# Patient Record
Sex: FEMALE | Race: WHITE | NOT HISPANIC OR LATINO
[De-identification: names, ages, dates, MRNs, and addresses within clinical notes are randomized per-mention and may not be internally consistent; named-entity substitution may affect disease eponyms.]

---

## 2017-01-06 ENCOUNTER — APPOINTMENT (OUTPATIENT)
Dept: ORTHOPEDIC SURGERY | Facility: CLINIC | Age: 82
End: 2017-01-06

## 2017-01-06 VITALS
HEART RATE: 71 BPM | BODY MASS INDEX: 23.92 KG/M2 | SYSTOLIC BLOOD PRESSURE: 112 MMHG | DIASTOLIC BLOOD PRESSURE: 71 MMHG | WEIGHT: 130 LBS | HEIGHT: 62 IN

## 2017-01-06 DIAGNOSIS — Z86.73 PERSONAL HISTORY OF TRANSIENT ISCHEMIC ATTACK (TIA), AND CEREBRAL INFARCTION W/OUT RESIDUAL DEFICITS: ICD-10-CM

## 2017-01-06 DIAGNOSIS — M17.0 BILATERAL PRIMARY OSTEOARTHRITIS OF KNEE: ICD-10-CM

## 2017-01-06 DIAGNOSIS — Z80.9 FAMILY HISTORY OF MALIGNANT NEOPLASM, UNSPECIFIED: ICD-10-CM

## 2017-01-06 DIAGNOSIS — Z86.79 PERSONAL HISTORY OF OTHER DISEASES OF THE CIRCULATORY SYSTEM: ICD-10-CM

## 2017-01-06 DIAGNOSIS — Z78.9 OTHER SPECIFIED HEALTH STATUS: ICD-10-CM

## 2017-01-06 DIAGNOSIS — E78.00 PURE HYPERCHOLESTEROLEMIA, UNSPECIFIED: ICD-10-CM

## 2017-01-06 DIAGNOSIS — Z85.9 PERSONAL HISTORY OF MALIGNANT NEOPLASM, UNSPECIFIED: ICD-10-CM

## 2017-01-06 RX ORDER — ATENOLOL 25 MG/1
25 TABLET ORAL
Refills: 0 | Status: ACTIVE | COMMUNITY

## 2017-01-06 RX ORDER — DIGOXIN 125 UG/1
125 TABLET ORAL
Refills: 0 | Status: ACTIVE | COMMUNITY

## 2017-01-06 RX ORDER — UBIDECARENONE/VIT E ACET 100MG-5
CAPSULE ORAL
Refills: 0 | Status: ACTIVE | COMMUNITY

## 2017-01-06 RX ORDER — CALCIUM CARBONATE/VITAMIN D3 600 MG-10
TABLET ORAL
Refills: 0 | Status: ACTIVE | COMMUNITY

## 2017-01-06 RX ORDER — FUROSEMIDE 20 MG/1
20 TABLET ORAL
Refills: 0 | Status: ACTIVE | COMMUNITY

## 2017-01-06 RX ORDER — MULTIVITAMIN
TABLET ORAL
Refills: 0 | Status: ACTIVE | COMMUNITY

## 2017-01-06 RX ORDER — WARFARIN 4 MG/1
TABLET ORAL
Refills: 0 | Status: ACTIVE | COMMUNITY

## 2018-04-25 ENCOUNTER — INPATIENT (INPATIENT)
Facility: HOSPITAL | Age: 83
LOS: 5 days | DRG: 871 | End: 2018-05-01
Attending: INTERNAL MEDICINE | Admitting: HOSPITALIST
Payer: MEDICARE

## 2018-04-25 VITALS
WEIGHT: 125 LBS | OXYGEN SATURATION: 86 % | RESPIRATION RATE: 24 BRPM | SYSTOLIC BLOOD PRESSURE: 154 MMHG | HEART RATE: 85 BPM | DIASTOLIC BLOOD PRESSURE: 70 MMHG | HEIGHT: 62 IN | TEMPERATURE: 100 F

## 2018-04-25 PROCEDURE — 99285 EMERGENCY DEPT VISIT HI MDM: CPT

## 2018-04-25 NOTE — ED ADULT TRIAGE NOTE - CHIEF COMPLAINT QUOTE
pt biba from home c/o SOB.  was seen recently by pmd to r/o pneumonia and was prescribed antibiotics.  pt has been taking cefuroxime for three days.  as per EMS, room air sat was 80%.

## 2018-04-26 DIAGNOSIS — A41.9 SEPSIS, UNSPECIFIED ORGANISM: ICD-10-CM

## 2018-04-26 DIAGNOSIS — Z98.890 OTHER SPECIFIED POSTPROCEDURAL STATES: Chronic | ICD-10-CM

## 2018-04-26 LAB
ALBUMIN SERPL ELPH-MCNC: 4.1 G/DL — SIGNIFICANT CHANGE UP (ref 3.3–5.2)
ALP SERPL-CCNC: 99 U/L — SIGNIFICANT CHANGE UP (ref 40–120)
ALT FLD-CCNC: 30 U/L — SIGNIFICANT CHANGE UP
ANION GAP SERPL CALC-SCNC: 14 MMOL/L — SIGNIFICANT CHANGE UP (ref 5–17)
ANION GAP SERPL CALC-SCNC: 15 MMOL/L — SIGNIFICANT CHANGE UP (ref 5–17)
APPEARANCE UR: CLEAR — SIGNIFICANT CHANGE UP
APTT BLD: 42.3 SEC — HIGH (ref 27.5–37.4)
AST SERPL-CCNC: 39 U/L — HIGH
BASOPHILS # BLD AUTO: 0 K/UL — SIGNIFICANT CHANGE UP (ref 0–0.2)
BASOPHILS # BLD AUTO: 0 K/UL — SIGNIFICANT CHANGE UP (ref 0–0.2)
BASOPHILS NFR BLD AUTO: 0.1 % — SIGNIFICANT CHANGE UP (ref 0–2)
BASOPHILS NFR BLD AUTO: 0.2 % — SIGNIFICANT CHANGE UP (ref 0–2)
BILIRUB SERPL-MCNC: 0.8 MG/DL — SIGNIFICANT CHANGE UP (ref 0.4–2)
BILIRUB UR-MCNC: NEGATIVE — SIGNIFICANT CHANGE UP
BUN SERPL-MCNC: 26 MG/DL — HIGH (ref 8–20)
BUN SERPL-MCNC: 27 MG/DL — HIGH (ref 8–20)
CALCIUM SERPL-MCNC: 8.5 MG/DL — LOW (ref 8.6–10.2)
CALCIUM SERPL-MCNC: 8.6 MG/DL — SIGNIFICANT CHANGE UP (ref 8.6–10.2)
CHLORIDE SERPL-SCNC: 88 MMOL/L — LOW (ref 98–107)
CHLORIDE SERPL-SCNC: 92 MMOL/L — LOW (ref 98–107)
CK MB CFR SERPL CALC: 10.6 NG/ML — HIGH (ref 0–6.7)
CK SERPL-CCNC: 217 U/L — HIGH (ref 25–170)
CO2 SERPL-SCNC: 25 MMOL/L — SIGNIFICANT CHANGE UP (ref 22–29)
CO2 SERPL-SCNC: 26 MMOL/L — SIGNIFICANT CHANGE UP (ref 22–29)
COLOR SPEC: YELLOW — SIGNIFICANT CHANGE UP
CREAT SERPL-MCNC: 1.15 MG/DL — SIGNIFICANT CHANGE UP (ref 0.5–1.3)
CREAT SERPL-MCNC: 1.19 MG/DL — SIGNIFICANT CHANGE UP (ref 0.5–1.3)
DIFF PNL FLD: ABNORMAL
EOSINOPHIL # BLD AUTO: 0 K/UL — SIGNIFICANT CHANGE UP (ref 0–0.5)
EOSINOPHIL # BLD AUTO: 0 K/UL — SIGNIFICANT CHANGE UP (ref 0–0.5)
EOSINOPHIL NFR BLD AUTO: 0 % — SIGNIFICANT CHANGE UP (ref 0–6)
EOSINOPHIL NFR BLD AUTO: 0 % — SIGNIFICANT CHANGE UP (ref 0–6)
EPI CELLS # UR: SIGNIFICANT CHANGE UP
GLUCOSE SERPL-MCNC: 137 MG/DL — HIGH (ref 70–115)
GLUCOSE SERPL-MCNC: 157 MG/DL — HIGH (ref 70–115)
GLUCOSE UR QL: NEGATIVE MG/DL — SIGNIFICANT CHANGE UP
HCT VFR BLD CALC: 29.3 % — LOW (ref 37–47)
HCT VFR BLD CALC: 34 % — LOW (ref 37–47)
HGB BLD-MCNC: 10.8 G/DL — LOW (ref 12–16)
HGB BLD-MCNC: 9.4 G/DL — LOW (ref 12–16)
HMPV RNA SPEC QL NAA+PROBE: DETECTED
INR BLD: 4.1 RATIO — HIGH (ref 0.88–1.16)
INR BLD: 4.67 RATIO — HIGH (ref 0.88–1.16)
KETONES UR-MCNC: ABNORMAL
LACTATE BLDV-MCNC: 1.5 MMOL/L — SIGNIFICANT CHANGE UP (ref 0.5–2)
LACTATE BLDV-MCNC: 2.5 MMOL/L — HIGH (ref 0.5–2)
LEUKOCYTE ESTERASE UR-ACNC: NEGATIVE — SIGNIFICANT CHANGE UP
LYMPHOCYTES # BLD AUTO: 0.3 K/UL — LOW (ref 1–4.8)
LYMPHOCYTES # BLD AUTO: 0.4 K/UL — LOW (ref 1–4.8)
LYMPHOCYTES # BLD AUTO: 4.8 % — LOW (ref 20–55)
LYMPHOCYTES # BLD AUTO: 6.8 % — LOW (ref 20–55)
MAGNESIUM SERPL-MCNC: 1.8 MG/DL — SIGNIFICANT CHANGE UP (ref 1.8–2.6)
MCHC RBC-ENTMCNC: 30.3 PG — SIGNIFICANT CHANGE UP (ref 27–31)
MCHC RBC-ENTMCNC: 30.8 PG — SIGNIFICANT CHANGE UP (ref 27–31)
MCHC RBC-ENTMCNC: 31.8 G/DL — LOW (ref 32–36)
MCHC RBC-ENTMCNC: 32.1 G/DL — SIGNIFICANT CHANGE UP (ref 32–36)
MCV RBC AUTO: 94.5 FL — SIGNIFICANT CHANGE UP (ref 81–99)
MCV RBC AUTO: 96.9 FL — SIGNIFICANT CHANGE UP (ref 81–99)
MONOCYTES # BLD AUTO: 0.3 K/UL — SIGNIFICANT CHANGE UP (ref 0–0.8)
MONOCYTES # BLD AUTO: 0.7 K/UL — SIGNIFICANT CHANGE UP (ref 0–0.8)
MONOCYTES NFR BLD AUTO: 6.2 % — SIGNIFICANT CHANGE UP (ref 3–10)
MONOCYTES NFR BLD AUTO: 8.3 % — SIGNIFICANT CHANGE UP (ref 3–10)
NEUTROPHILS # BLD AUTO: 4.2 K/UL — SIGNIFICANT CHANGE UP (ref 1.8–8)
NEUTROPHILS # BLD AUTO: 7.5 K/UL — SIGNIFICANT CHANGE UP (ref 1.8–8)
NEUTROPHILS NFR BLD AUTO: 86.5 % — HIGH (ref 37–73)
NEUTROPHILS NFR BLD AUTO: 86.8 % — HIGH (ref 37–73)
NITRITE UR-MCNC: NEGATIVE — SIGNIFICANT CHANGE UP
NT-PROBNP SERPL-SCNC: HIGH PG/ML (ref 0–300)
PH UR: 5 — SIGNIFICANT CHANGE UP (ref 5–8)
PHOSPHATE SERPL-MCNC: 4 MG/DL — SIGNIFICANT CHANGE UP (ref 2.4–4.7)
PLATELET # BLD AUTO: 108 K/UL — LOW (ref 150–400)
PLATELET # BLD AUTO: 168 K/UL — SIGNIFICANT CHANGE UP (ref 150–400)
POTASSIUM SERPL-MCNC: 3.9 MMOL/L — SIGNIFICANT CHANGE UP (ref 3.5–5.3)
POTASSIUM SERPL-MCNC: 4.2 MMOL/L — SIGNIFICANT CHANGE UP (ref 3.5–5.3)
POTASSIUM SERPL-SCNC: 3.9 MMOL/L — SIGNIFICANT CHANGE UP (ref 3.5–5.3)
POTASSIUM SERPL-SCNC: 4.2 MMOL/L — SIGNIFICANT CHANGE UP (ref 3.5–5.3)
PROT SERPL-MCNC: 6.7 G/DL — SIGNIFICANT CHANGE UP (ref 6.6–8.7)
PROT UR-MCNC: 30 MG/DL
PROTHROM AB SERPL-ACNC: 46.4 SEC — HIGH (ref 9.8–12.7)
PROTHROM AB SERPL-ACNC: 53 SEC — HIGH (ref 9.8–12.7)
RAPID RVP RESULT: DETECTED
RBC # BLD: 3.1 M/UL — LOW (ref 4.4–5.2)
RBC # BLD: 3.51 M/UL — LOW (ref 4.4–5.2)
RBC # FLD: 13.8 % — SIGNIFICANT CHANGE UP (ref 11–15.6)
RBC # FLD: 14 % — SIGNIFICANT CHANGE UP (ref 11–15.6)
RBC CASTS # UR COMP ASSIST: SIGNIFICANT CHANGE UP /HPF (ref 0–4)
SODIUM SERPL-SCNC: 129 MMOL/L — LOW (ref 135–145)
SODIUM SERPL-SCNC: 131 MMOL/L — LOW (ref 135–145)
SP GR SPEC: 1.01 — SIGNIFICANT CHANGE UP (ref 1.01–1.02)
TROPONIN T SERPL-MCNC: 0.26 NG/ML — HIGH (ref 0–0.06)
UROBILINOGEN FLD QL: NEGATIVE MG/DL — SIGNIFICANT CHANGE UP
WBC # BLD: 4.8 K/UL — SIGNIFICANT CHANGE UP (ref 4.8–10.8)
WBC # BLD: 8.7 K/UL — SIGNIFICANT CHANGE UP (ref 4.8–10.8)
WBC # FLD AUTO: 4.8 K/UL — SIGNIFICANT CHANGE UP (ref 4.8–10.8)
WBC # FLD AUTO: 8.7 K/UL — SIGNIFICANT CHANGE UP (ref 4.8–10.8)
WBC UR QL: NEGATIVE — SIGNIFICANT CHANGE UP

## 2018-04-26 PROCEDURE — 99223 1ST HOSP IP/OBS HIGH 75: CPT

## 2018-04-26 PROCEDURE — 71045 X-RAY EXAM CHEST 1 VIEW: CPT | Mod: 26

## 2018-04-26 RX ORDER — ACETAMINOPHEN 500 MG
650 TABLET ORAL EVERY 6 HOURS
Qty: 0 | Refills: 0 | Status: DISCONTINUED | OUTPATIENT
Start: 2018-04-26 | End: 2018-05-01

## 2018-04-26 RX ORDER — CHOLECALCIFEROL (VITAMIN D3) 125 MCG
1 CAPSULE ORAL
Qty: 0 | Refills: 0 | COMMUNITY

## 2018-04-26 RX ORDER — AZITHROMYCIN 500 MG/1
500 TABLET, FILM COATED ORAL EVERY 24 HOURS
Qty: 0 | Refills: 0 | Status: COMPLETED | OUTPATIENT
Start: 2018-04-26 | End: 2018-04-28

## 2018-04-26 RX ORDER — DIGOXIN 250 MCG
0.12 TABLET ORAL DAILY
Qty: 0 | Refills: 0 | Status: DISCONTINUED | OUTPATIENT
Start: 2018-04-26 | End: 2018-05-01

## 2018-04-26 RX ORDER — SODIUM CHLORIDE 9 MG/ML
3 INJECTION INTRAMUSCULAR; INTRAVENOUS; SUBCUTANEOUS ONCE
Qty: 0 | Refills: 0 | Status: COMPLETED | OUTPATIENT
Start: 2018-04-26 | End: 2018-04-26

## 2018-04-26 RX ORDER — SODIUM CHLORIDE 9 MG/ML
1800 INJECTION INTRAMUSCULAR; INTRAVENOUS; SUBCUTANEOUS ONCE
Qty: 0 | Refills: 0 | Status: COMPLETED | OUTPATIENT
Start: 2018-04-26 | End: 2018-04-26

## 2018-04-26 RX ORDER — IPRATROPIUM/ALBUTEROL SULFATE 18-103MCG
3 AEROSOL WITH ADAPTER (GRAM) INHALATION EVERY 6 HOURS
Qty: 0 | Refills: 0 | Status: COMPLETED | OUTPATIENT
Start: 2018-04-26 | End: 2018-04-27

## 2018-04-26 RX ORDER — ALBUTEROL 90 UG/1
2.5 AEROSOL, METERED ORAL EVERY 4 HOURS
Qty: 0 | Refills: 0 | Status: DISCONTINUED | OUTPATIENT
Start: 2018-04-26 | End: 2018-05-01

## 2018-04-26 RX ORDER — ALBUTEROL 90 UG/1
2.5 AEROSOL, METERED ORAL ONCE
Qty: 0 | Refills: 0 | Status: COMPLETED | OUTPATIENT
Start: 2018-04-26 | End: 2018-04-26

## 2018-04-26 RX ORDER — SACCHAROMYCES BOULARDII 250 MG
250 POWDER IN PACKET (EA) ORAL
Qty: 0 | Refills: 0 | Status: DISCONTINUED | OUTPATIENT
Start: 2018-04-26 | End: 2018-05-01

## 2018-04-26 RX ORDER — MULTIVIT-MIN/FERROUS GLUCONATE 9 MG/15 ML
1 LIQUID (ML) ORAL
Qty: 0 | Refills: 0 | COMMUNITY

## 2018-04-26 RX ORDER — CEFTRIAXONE 500 MG/1
1000 INJECTION, POWDER, FOR SOLUTION INTRAMUSCULAR; INTRAVENOUS EVERY 24 HOURS
Qty: 0 | Refills: 0 | Status: DISCONTINUED | OUTPATIENT
Start: 2018-04-26 | End: 2018-04-29

## 2018-04-26 RX ORDER — ACETAMINOPHEN 500 MG
650 TABLET ORAL ONCE
Qty: 0 | Refills: 0 | Status: COMPLETED | OUTPATIENT
Start: 2018-04-26 | End: 2018-04-26

## 2018-04-26 RX ORDER — PIPERACILLIN AND TAZOBACTAM 4; .5 G/20ML; G/20ML
3.38 INJECTION, POWDER, LYOPHILIZED, FOR SOLUTION INTRAVENOUS ONCE
Qty: 0 | Refills: 0 | Status: COMPLETED | OUTPATIENT
Start: 2018-04-26 | End: 2018-04-26

## 2018-04-26 RX ORDER — ACETAMINOPHEN 500 MG
1000 TABLET ORAL ONCE
Qty: 0 | Refills: 0 | Status: COMPLETED | OUTPATIENT
Start: 2018-04-26 | End: 2018-04-26

## 2018-04-26 RX ORDER — FUROSEMIDE 40 MG
40 TABLET ORAL ONCE
Qty: 0 | Refills: 0 | Status: COMPLETED | OUTPATIENT
Start: 2018-04-26 | End: 2018-04-26

## 2018-04-26 RX ORDER — FUROSEMIDE 40 MG
1 TABLET ORAL
Qty: 0 | Refills: 0 | COMMUNITY

## 2018-04-26 RX ORDER — CEFTRIAXONE 500 MG/1
1 INJECTION, POWDER, FOR SOLUTION INTRAMUSCULAR; INTRAVENOUS EVERY 24 HOURS
Qty: 0 | Refills: 0 | Status: DISCONTINUED | OUTPATIENT
Start: 2018-04-26 | End: 2018-04-26

## 2018-04-26 RX ORDER — WARFARIN SODIUM 2.5 MG/1
3.5 TABLET ORAL
Qty: 0 | Refills: 0 | COMMUNITY

## 2018-04-26 RX ORDER — IPRATROPIUM/ALBUTEROL SULFATE 18-103MCG
3 AEROSOL WITH ADAPTER (GRAM) INHALATION ONCE
Qty: 0 | Refills: 0 | Status: COMPLETED | OUTPATIENT
Start: 2018-04-26 | End: 2018-04-26

## 2018-04-26 RX ORDER — CEFUROXIME AXETIL 250 MG
1 TABLET ORAL
Qty: 0 | Refills: 0 | COMMUNITY

## 2018-04-26 RX ORDER — ATENOLOL 25 MG/1
1 TABLET ORAL
Qty: 0 | Refills: 0 | COMMUNITY

## 2018-04-26 RX ORDER — CHOLECALCIFEROL (VITAMIN D3) 125 MCG
1000 CAPSULE ORAL DAILY
Qty: 0 | Refills: 0 | Status: DISCONTINUED | OUTPATIENT
Start: 2018-04-26 | End: 2018-05-01

## 2018-04-26 RX ORDER — SODIUM CHLORIDE 9 MG/ML
250 INJECTION INTRAMUSCULAR; INTRAVENOUS; SUBCUTANEOUS
Qty: 0 | Refills: 0 | Status: COMPLETED | OUTPATIENT
Start: 2018-04-26 | End: 2018-04-26

## 2018-04-26 RX ORDER — VANCOMYCIN HCL 1 G
1000 VIAL (EA) INTRAVENOUS ONCE
Qty: 0 | Refills: 0 | Status: COMPLETED | OUTPATIENT
Start: 2018-04-26 | End: 2018-04-26

## 2018-04-26 RX ADMIN — AZITHROMYCIN 255 MILLIGRAM(S): 500 TABLET, FILM COATED ORAL at 18:39

## 2018-04-26 RX ADMIN — Medication 3 MILLILITER(S): at 00:16

## 2018-04-26 RX ADMIN — PIPERACILLIN AND TAZOBACTAM 200 GRAM(S): 4; .5 INJECTION, POWDER, LYOPHILIZED, FOR SOLUTION INTRAVENOUS at 01:27

## 2018-04-26 RX ADMIN — Medication 40 MILLIGRAM(S): at 00:24

## 2018-04-26 RX ADMIN — SODIUM CHLORIDE 3 MILLILITER(S): 9 INJECTION INTRAMUSCULAR; INTRAVENOUS; SUBCUTANEOUS at 00:25

## 2018-04-26 RX ADMIN — Medication 3 MILLILITER(S): at 21:13

## 2018-04-26 RX ADMIN — Medication 250 MILLIGRAM(S): at 05:22

## 2018-04-26 RX ADMIN — Medication 0.12 MILLIGRAM(S): at 05:22

## 2018-04-26 RX ADMIN — SODIUM CHLORIDE 80 MILLILITER(S): 9 INJECTION INTRAMUSCULAR; INTRAVENOUS; SUBCUTANEOUS at 06:02

## 2018-04-26 RX ADMIN — Medication 1000 UNIT(S): at 11:21

## 2018-04-26 RX ADMIN — SODIUM CHLORIDE 900 MILLILITER(S): 9 INJECTION INTRAMUSCULAR; INTRAVENOUS; SUBCUTANEOUS at 01:36

## 2018-04-26 RX ADMIN — ALBUTEROL 2.5 MILLIGRAM(S): 90 AEROSOL, METERED ORAL at 00:16

## 2018-04-26 RX ADMIN — Medication 3 MILLILITER(S): at 15:42

## 2018-04-26 RX ADMIN — Medication 400 MILLIGRAM(S): at 14:36

## 2018-04-26 RX ADMIN — Medication 1 TABLET(S): at 11:21

## 2018-04-26 RX ADMIN — CEFTRIAXONE 1000 MILLIGRAM(S): 500 INJECTION, POWDER, FOR SOLUTION INTRAMUSCULAR; INTRAVENOUS at 18:36

## 2018-04-26 RX ADMIN — Medication 250 MILLIGRAM(S): at 00:29

## 2018-04-26 RX ADMIN — Medication 650 MILLIGRAM(S): at 00:30

## 2018-04-26 RX ADMIN — Medication 250 MILLIGRAM(S): at 18:37

## 2018-04-26 NOTE — H&P ADULT - HISTORY OF PRESENT ILLNESS
89 years old female with PMH of CHF, A. Fib on Coumadin, MV Repair, TIA and Breast Cancer brought by EMS with shortness of breath. As per patient, it started 2 days ago and is associated with productive cough. She went to see her PMD yesterday, who gave her Cefuroxime and Benzonatate She took 3 doses of antibiotics but her symptoms were worsening instead of improving so she came to the hospital. She is feeling weak and nauseous but denies vomiting, abdominal pain, headache, dizziness, fever, sick contacts or recent travel.  She was Code Sepsis in ER.

## 2018-04-26 NOTE — ED PROVIDER NOTE - OBJECTIVE STATEMENT
89 y/o F with PMHx of CHF, a-fib, MVP, pacemaker and TIA presents to ED c/o difficulty breathing onset 2 days ago. Associated productive cough with yellow phlegm. Denies any hx of respiratory problems, abdominal pain, nausea, vomiting, diarrhea, headaches, chest pain or palpitations. Saw PMD yesterday for cough, patient states 'I was given medicine for it but cannot recall name". Denies smoking. No further acute complaints at this time.    Cardiologist: Dr. Ellis   PMD: Dr. Andrews    Code sepsis initiated. 89 y/o F with PMHx of CHF, a-fib, MVP, pacemaker and TIA presents to ED c/o difficulty breathing onset 2 days ago. Associated productive cough with yellow phlegm. Denies any hx of respiratory problems, abdominal pain, nausea, vomiting, diarrhea, headaches, chest pain or palpitations. Saw PMD yesterday for cough, patient states 'I was given medicine for it but cannot recall name". Denies smoking. No further acute complaints at this time.    Cardiologist: Dr. Sotelo   PMD: Dr. Andrews    Code sepsis initiated.

## 2018-04-26 NOTE — PATIENT PROFILE ADULT. - VISION (WITH CORRECTIVE LENSES IF THE PATIENT USUALLY WEARS THEM):
Partially impaired: cannot see medication labels or newsprint, but can see obstacles in path, and the surrounding layout; can count fingers at arm's length/wear Glasses at home

## 2018-04-26 NOTE — H&P ADULT - ASSESSMENT
89 years old female with PMH of CHF, A. Fib on Coumadin, MV Repair, TIA and Breast Cancer brought by EMS with shortness of breath. As per patient, it started 2 days ago and is associated with productive cough. She went to see her PMD yesterday, who gave her Cefuroxime and Benzonatate She took 3 doses of antibiotics but her symptoms were worsening instead of improving so she came to the hospital. She is feeling weak and nauseous but denies vomiting, abdominal pain, headache, dizziness, fever, sick contacts or recent travel.  She was Code Sepsis in ER. 89 years old female with PMH of CHF, A. Fib on Coumadin, MV Repair, TIA and Breast Cancer brought by EMS with shortness of breath. As per patient, it started 2 days ago and is associated with productive cough. She went to see her PMD yesterday, who gave her Cefuroxime and Benzonatate She took 3 doses of antibiotics but her symptoms were worsening instead of improving so she came to the hospital. She is feeling weak and nauseous but denies vomiting, abdominal pain, headache, dizziness, fever, sick contacts or recent travel.  She was Code Sepsis in ER.     1) Community Acquired Pneumonia (likely strep/atypical bacteria)  - Blood Cultures sent  - RVP  - Sputum Culture  - Legionella Antigen  - Rocephin 1 gm and Zithromax 500 mg  - Florastor 250 mg  2) Sepsis  - Cultures sent and 250 cc IVF given. Hold further IVF given history of CHF.  - Repeat Lactate  - Got Vanco and Zosyn in ER. Will continue Rocephin and Zithromax for CAP  3) 89 years old female with PMH of CHF, A. Fib on Coumadin, MV Repair, TIA and Breast Cancer brought by EMS with shortness of breath. As per patient, it started 2 days ago and is associated with productive cough. She went to see her PMD yesterday, who gave her Cefuroxime and Benzonatate She took 3 doses of antibiotics but her symptoms were worsening instead of improving so she came to the hospital. She is feeling weak and nauseous but denies vomiting, abdominal pain, headache, dizziness, fever, sick contacts or recent travel.  She was Code Sepsis in ER.     1) Community Acquired Pneumonia (likely strep/atypical bacteria)  - Blood Cultures sent  - RVP  - Sputum Culture  - Legionella Antigen  - Rocephin 1 gm and Zithromax 500 mg  - Florastor 250 mg  2) Sepsis  - Cultures sent and 250 cc IVF given. Hold further IVF given history of CHF.  - Repeat Lactate  - Got Vanco and Zosyn in ER. Will continue Rocephin and Zithromax for CAP  3) Systolic Heart Failure  - Hold Lasix and Atenolol for now.   - Continue Digoxin.  - Not on ACEI/ARB  4) Atrial Fibrillation  - Rate controlled  - Continue Digoxin   - Hold Atenolol secondary to Sepsis. Will restart if BP remains stable.  - INR supratherapeutic  5) Supratherapeutic INR  - No signs of bleeding  - Repeat INR in am  6) VERONIKA  - Hold Lasix  - Got 250 cc IVF.   - Monitor renal function.  - Avoid nephrotoxic medications  DVT Prophylaxis -- Patient is on Coumadin and INR is supratherapeutic. 89 years old female with PMH of CHF, A. Fib on Coumadin, MV Repair, TIA and Breast Cancer brought by EMS with shortness of breath. As per patient, it started 2 days ago and is associated with productive cough. She went to see her PMD yesterday, who gave her Cefuroxime and Benzonatate She took 3 doses of antibiotics but her symptoms were worsening instead of improving so she came to the hospital. She is feeling weak and nauseous but denies vomiting, abdominal pain, headache, dizziness, fever, sick contacts or recent travel.  She was Code Sepsis in ER.     1) Community Acquired Pneumonia (likely strep/atypical bacteria)  - Blood Cultures sent  - RVP  - Sputum Culture  - Legionella Antigen  - Rocephin 1 gm and Zithromax 500 mg  - Florastor 250 mg  2) Sepsis  - Cultures sent and 250 cc IVF given. Hold further IVF given history of CHF.  - Repeat Lactate  - Got Vanco and Zosyn in ER. Will continue Rocephin and Zithromax for CAP  3) Systolic Heart Failure  - Hold Lasix and Atenolol for now.   - Continue Digoxin.  - Not on ACEI/ARB  4) Atrial Fibrillation  - Rate controlled  - Continue Digoxin   - Hold Atenolol secondary to Sepsis. Will restart if BP remains stable.  - INR supratherapeutic  5) Supratherapeutic INR  - No signs of bleeding  - Repeat INR in am  6) VERONIKA  - Hold Lasix  - Got 250 cc IVF.   - Monitor renal function.  - Avoid nephrotoxic medications  7) Anemia  - Unknown baseline  - Denies GI bleed  - Monitor H&H  DVT Prophylaxis -- Patient is on Coumadin and INR is supratherapeutic.

## 2018-04-26 NOTE — PROGRESS NOTE ADULT - SUBJECTIVE AND OBJECTIVE BOX
CC: Cough . pneumonia. Humanmetapneuvirus infection .  HPI:  89 years old female with PMH of CHF, A. Fib on Coumadin, MV Repair, TIA and Breast Cancer brought by EMS with shortness of breath. As per patient, it started 2 days ago and is associated with productive cough. She went to see her PMD yesterday, who gave her Cefuroxime and Benzonatate She took 3 doses of antibiotics but her symptoms were worsening instead of improving so she came to the hospital. She is feeling weak and nauseous but denies vomiting, abdominal pain, headache, dizziness, fever, sick contacts or recent travel.  She was Code Sepsis in ER. (2018 02:39)    REVIEW OF SYSTEMS:    Patient denied fever, chills, abdominal pain, nausea, vomiting, cough, shortness of breath, chest pain or palpitations    Vital Signs Last 24 Hrs  T(C): 36.7 (2018 07:47), Max: 39.1 (2018 00:14)  T(F): 98.1 (2018 07:47), Max: 102.3 (2018 00:14)  HR: 61 (2018 11:39) (61 - 85)  BP: 121/70 (2018 11:39) (90/52 - 154/70)  BP(mean): 70 (2018 05:23) (66 - 70)  RR: 22 (2018 11:39) (20 - 26)  SpO2: 98% (2018 11:39) (86% - 98%)I&O's Summary    PHYSICAL EXAM:  GENERAL: NAD,  HEENT: PERRL, +EOMI, anicteric, no [place]  NECK: Supple, No JVD   CHEST/LUNG:  bilateral rale, crackles   HEART: S1S2 Normal intensity, no murmurs, gallops or rubs noted  ABDOMEN: Soft, BS Normoactive, NT, ND, no HSM noted  EXTREMITIES:  2+ radial and DP pulses noted, no clubbing, cyanosis, or edema noted, Limited mobility   SKIN: No rashes or lesions noted  NEURO: A&Ox3, no focal deficits noted, CN II-XII intact  PSYCH: normal mood and affect; insight/judgement appropriate  LABS:                        9.4    4.8   )-----------( 108      ( 2018 07:45 )             29.3         131<L>  |  92<L>  |  27.0<H>  ----------------------------<  137<H>  3.9   |  25.0  |  1.15    Ca    8.6      2018 07:44  Phos  4.0       Mg     1.8         TPro  6.7  /  Alb  4.1  /  TBili  0.8  /  DBili  x   /  AST  39<H>  /  ALT  30  /  AlkPhos  99      PT/INR - ( 2018 07:44 )   PT: 53.0 sec;   INR: 4.67 ratio         PTT - ( 2018 00:33 )  PTT:42.3 sec  Urinalysis Basic - ( 2018 03:24 )    Color: Yellow / Appearance: Clear / S.015 / pH: x  Gluc: x / Ketone: Trace  / Bili: Negative / Urobili: Negative mg/dL   Blood: x / Protein: 30 mg/dL / Nitrite: Negative   Leuk Esterase: Negative / RBC: 0-2 /HPF / WBC Negative   Sq Epi: x / Non Sq Epi: Occasional / Bacteria: x      RADIOLOGY & ADDITIONAL TESTS:    MEDICATIONS:  MEDICATIONS  (STANDING):  ALBUTerol/ipratropium for Nebulization 3 milliLiter(s) Nebulizer every 6 hours  azithromycin  IVPB 500 milliGRAM(s) IV Intermittent every 24 hours  cefTRIAXone Injectable. 1000 milliGRAM(s) IV Push every 24 hours  cholecalciferol 1000 Unit(s) Oral daily  digoxin     Tablet 0.125 milliGRAM(s) Oral daily  multivitamin 1 Tablet(s) Oral daily  saccharomyces boulardii 250 milliGRAM(s) Oral two times a day    MEDICATIONS  (PRN):  acetaminophen   Tablet 650 milliGRAM(s) Oral every 6 hours PRN For Temp greater than 38 C (100.4 F)  acetaminophen   Tablet. 650 milliGRAM(s) Oral every 6 hours PRN Headache or Bodyache  ALBUTerol    0.083% 2.5 milliGRAM(s) Nebulizer every 4 hours PRN Shortness of Breath and/or Wheezing  guaiFENesin   Syrup  (Sugar-Free) 100 milliGRAM(s) Oral every 6 hours PRN Cough

## 2018-04-26 NOTE — ED ADULT NURSE NOTE - PMH
Afib    Breast CA, right  lumpectomy  MVP (mitral valve prolapse)    Pacemaker    TIA (transient ischemic attack)

## 2018-04-26 NOTE — H&P ADULT - NSHPPHYSICALEXAM_GEN_ALL_CORE
Vital Signs   T(C): 36.8 (26 Apr 2018 02:03), Max: 39.1 (26 Apr 2018 00:14)  T(F): 98.2 (26 Apr 2018 02:03), Max: 102.3 (26 Apr 2018 00:14)  HR: 64 (26 Apr 2018 02:03) (62 - 85)  BP: 90/52 (26 Apr 2018 02:03) (90/52 - 154/70)  RR: 24 (26 Apr 2018 02:03) (24 - 26)  SpO2: 95% (26 Apr 2018 02:03) (86% - 95%)  General: Elderly female sitting in bed with mild distress secondary to coughing  HEENT: PERRLA. EOMI. Clear conjunctivae. Moist mucus membrane  Neck: Supple. No JVD. No Thyromegaly   Chest: Good air entry. Bilateral crackles (R>L). No wheezing rhonchi. Pacemaker in left upper chest.   Heart: Normal S1 & S2 with regularly irregular rhythm.   Abdomen: Soft. Non-tender. Non-distended. + BS  Ext: 2+ pedal edema. No calf tenderness   Neuro: AAO x 3, No focal deficit, CN II-XII grossly WNL and no speech disorder  Skin: Warm and Dry  Psychiatry: Normal mood and affect

## 2018-04-26 NOTE — ED ADULT NURSE REASSESSMENT NOTE - NS ED NURSE REASSESS COMMENT FT1
Pt. BP dropping (90/52), all other vital signs remain stable, pt. remains awake and alert. MD Perez at bedside to assess, informed of 250 mL bolus given as per MD Thompson due to CHF. given ok by MD Perez not to continue with sepsis protocol bolus of 1800mL due to pt. Hx. will continue to monitor and reassess.

## 2018-04-26 NOTE — H&P ADULT - FAMILY HISTORY
Sibling  Still living? No  Family history of stroke, Age at diagnosis: 51-60     Sibling  Still living? No  Family history of stroke, Age at diagnosis: 51-60     Sibling  Still living? No  Family history of breast cancer in sister, Age at diagnosis: 51-60

## 2018-04-26 NOTE — ED ADULT NURSE REASSESSMENT NOTE - NS ED NURSE REASSESS COMMENT FT1
Obtained urine and RVP as per orders. Pt. instructed on need for sputum sample. provided with specimen cup

## 2018-04-26 NOTE — PROGRESS NOTE ADULT - ASSESSMENT
89 years old female with PMH of CHF, A. Fib on Coumadin, MV Repair, TIA and Breast Cancer brought by EMS with shortness of breath. As per patient, it started 2 days ago and is associated with productive cough. She went to see her PMD yesterday, who gave her Cefuroxime and Benzonatate She took 3 doses of antibiotics but her symptoms were worsening instead of improving so she came to the hospital. She is feeling weak and nauseous but denies vomiting, abdominal pain, headache, dizziness, fever, sick contacts or recent travel.  She was Code Sepsis in ER.     1) Community Acquired Pneumonia (likely strep/atypical bacteria)  - Blood Cultures sent  - RVP Human metapneumovirus infection.   - Rocephin 1 gm and Zithromax 500 mg  - Florastor 250 mg    2) Sepsis  - Cultures sent and 250 cc IVF given. Hold further IVF given history of CHF.  - Repeat Lactate 2.5- 1.5 trending down.   - Got Vanco and Zosyn in ER. Will continue Rocephin and Zithromax for CAP    3) Systolic Heart Failure  - Hold Lasix and Atenolol for now.   - Continue Digoxin.  - Not on ACEI/ARB    4) Atrial Fibrillation  - Rate controlled  - Continue Digoxin   - Hold Atenolol secondary to Sepsis. Will restart if BP remains stable.  - INR supratherapeutic      5) Supratherapeutic INR  - No signs of bleeding  - Repeat INR in am    6) VERONIKA  - Hold Lasix  - Got 250 cc IVF.   - Monitor renal function.  - Avoid nephrotoxic medications    7) Anemia  - Unknown baseline  - Denies GI bleed  - Monitor H&H  DVT Prophylaxis -- Patient is on Coumadin and INR is supratherapeutic.

## 2018-04-26 NOTE — H&P ADULT - NSHPLABSRESULTS_GEN_ALL_CORE
LABS:                        10.8   8.7   )-----------( 168      ( 26 Apr 2018 00:33 )             34.0     04-26    129<L>  |  88<L>  |  26.0<H>  ----------------------------<  157<H>  4.2   |  26.0  |  1.19    Ca    8.5<L>      26 Apr 2018 00:33    TPro  6.7  /  Alb  4.1  /  TBili  0.8  /  DBili  x   /  AST  39<H>  /  ALT  30  /  AlkPhos  99  04-26    PT/INR - ( 26 Apr 2018 00:33 )   PT: 46.4 sec;   INR: 4.10 ratio         PTT - ( 26 Apr 2018 00:33 )  PTT:42.3 sec  CARDIAC MARKERS ( 26 Apr 2018 00:33 )  x     / 0.05 ng/mL / 210 U/L / x     / 5.1 ng/mL      CXR: Multifocal Pneumonia (official report pending)

## 2018-04-26 NOTE — ED ADULT NURSE NOTE - OBJECTIVE STATEMENT
pt BIBA, received Alert and Oriented to person, place, situation and time with family at bedside. pt c/o shortness of breath and cough for 2 days. pt denies pain at this time. pt has temp upon arrival. code team 1 at bedside. HR is regular, lung sounds are rhonci and wheeze b/l, abd is soft and nontender with positive bowel sounds in all four quadrants, skin is warm, dry and appropriate for age and race. pt educated on plan of care, plan of care taught back to RN. proficiency determined from successful pt teach back. will continue to educate pt throughout ED stay.

## 2018-04-26 NOTE — H&P ADULT - PSH
H/O hernia repair    S/P cataract surgery, right    S/P cholecystectomy  with AP  S/P hysterectomy    S/P mastectomy, right    S/P mitral valve repair  PPM

## 2018-04-26 NOTE — ED PROVIDER NOTE - MEDICAL DECISION MAKING DETAILS
Will follow sepsis protocol, IV antibiotics, IV fluids, CXR consistent with CHF, diuretics, speak with cardiology and re-assess after breathing treatment.

## 2018-04-26 NOTE — ED PROVIDER NOTE - PSH
S/P cataract surgery, right    S/P cholecystectomy  with AP  S/P hysterectomy    S/P mastectomy, right    S/P mitral valve repair  PPM

## 2018-04-27 LAB
CULTURE RESULTS: NO GROWTH — SIGNIFICANT CHANGE UP
INR BLD: 4.52 RATIO — HIGH (ref 0.88–1.16)
LEGIONELLA AG UR QL: NEGATIVE — SIGNIFICANT CHANGE UP
PROTHROM AB SERPL-ACNC: 51.3 SEC — HIGH (ref 9.8–12.7)
SPECIMEN SOURCE: SIGNIFICANT CHANGE UP

## 2018-04-27 PROCEDURE — 99233 SBSQ HOSP IP/OBS HIGH 50: CPT

## 2018-04-27 RX ORDER — WARFARIN SODIUM 2.5 MG/1
3 TABLET ORAL ONCE
Qty: 0 | Refills: 0 | Status: DISCONTINUED | OUTPATIENT
Start: 2018-04-27 | End: 2018-04-27

## 2018-04-27 RX ORDER — BENZOCAINE AND MENTHOL 5; 1 G/100ML; G/100ML
1 LIQUID ORAL
Qty: 0 | Refills: 0 | Status: DISCONTINUED | OUTPATIENT
Start: 2018-04-27 | End: 2018-05-01

## 2018-04-27 RX ADMIN — Medication 650 MILLIGRAM(S): at 02:33

## 2018-04-27 RX ADMIN — Medication 0.12 MILLIGRAM(S): at 05:52

## 2018-04-27 RX ADMIN — CEFTRIAXONE 1000 MILLIGRAM(S): 500 INJECTION, POWDER, FOR SOLUTION INTRAMUSCULAR; INTRAVENOUS at 18:43

## 2018-04-27 RX ADMIN — Medication 1 TABLET(S): at 12:06

## 2018-04-27 RX ADMIN — AZITHROMYCIN 255 MILLIGRAM(S): 500 TABLET, FILM COATED ORAL at 18:42

## 2018-04-27 RX ADMIN — Medication 3 MILLILITER(S): at 03:33

## 2018-04-27 RX ADMIN — BENZOCAINE AND MENTHOL 1 LOZENGE: 5; 1 LIQUID ORAL at 12:06

## 2018-04-27 RX ADMIN — Medication 250 MILLIGRAM(S): at 05:51

## 2018-04-27 RX ADMIN — Medication 250 MILLIGRAM(S): at 18:42

## 2018-04-27 RX ADMIN — Medication 1000 UNIT(S): at 12:05

## 2018-04-27 RX ADMIN — Medication 100 MILLIGRAM(S): at 13:25

## 2018-04-27 NOTE — PROGRESS NOTE ADULT - SUBJECTIVE AND OBJECTIVE BOX
CC:  Afib, MVR repair on coumadin.  Viral syndrome from human metapneumo virus infection.  CXR bilateral multifocal perihilar upper lobe opacities- Juan pneumonia  HPI:  89 years old female with PMH of CHF, A. Fib on Coumadin, MV Repair, TIA and Breast Cancer brought by EMS with shortness of breath. As per patient, it started 2 days ago and is associated with productive cough. She went to see her PMD yesterday, who gave her Cefuroxime and Benzonatate She took 3 doses of antibiotics but her symptoms were worsening instead of improving so she came to the hospital. She is feeling weak and nauseous but denies vomiting, abdominal pain, headache, dizziness, fever, sick contacts or recent travel.  She was Code Sepsis in ER. (2018 02:39)    REVIEW OF SYSTEMS:    Patient denied fever, chills, abdominal pain, nausea, vomiting, cough, shortness of breath, chest pain or palpitations    Vital Signs Last 24 Hrs  T(C): 36.2 (2018 08:16), Max: 38.4 (2018 13:45)  T(F): 97.1 (2018 08:16), Max: 101.1 (2018 13:45)  HR: 66 (2018 08:16) (54 - 111)  BP: 90/53 (2018 08:16) (90/53 - 121/70)  BP(mean): --  RR: 19 (2018 08:16) (18 - 22)  SpO2: 96% (2018 09:21) (91% - 98%)I&O's Summary    2018 07:01  -  2018 07:00  --------------------------------------------------------  IN: 0 mL / OUT: 200 mL / NET: -200 mL      PHYSICAL EXAM:  GENERAL: NAD,   HEENT: PERRL, +EOMI, anicteric, no [place]  NECK: Supple, No JVD   CHEST/LUNG: Bilateral rales   HEART: S1S2 Normal intensity, no murmurs, gallops or rubs noted  ABDOMEN: Soft, BS Normoactive, NT, ND, no HSM noted  EXTREMITIES:  2+ radial and DP pulses noted, no clubbing, cyanosis, or edema noted, Limited mobility   SKIN: No rashes or lesions noted  NEURO: A&Ox3, no focal deficits noted, CN II-XII intact  PSYCH: Depressed mood and affect; insight/judgement appropriate  LABS:                        9.4    4.8   )-----------( 108      ( 2018 07:45 )             29.3         131<L>  |  92<L>  |  27.0<H>  ----------------------------<  137<H>  3.9   |  25.0  |  1.15    Ca    8.6      2018 07:44  Phos  4.0       Mg     1.8         TPro  6.7  /  Alb  4.1  /  TBili  0.8  /  DBili  x   /  AST  39<H>  /  ALT  30  /  AlkPhos  99      PT/INR - ( 2018 07:44 )   PT: 53.0 sec;   INR: 4.67 ratio         PTT - ( 2018 00:33 )  PTT:42.3 sec  Urinalysis Basic - ( 2018 03:24 )    Color: Yellow / Appearance: Clear / S.015 / pH: x  Gluc: x / Ketone: Trace  / Bili: Negative / Urobili: Negative mg/dL   Blood: x / Protein: 30 mg/dL / Nitrite: Negative   Leuk Esterase: Negative / RBC: 0-2 /HPF / WBC Negative   Sq Epi: x / Non Sq Epi: Occasional / Bacteria: x      RADIOLOGY & ADDITIONAL TESTS:    MEDICATIONS:  MEDICATIONS  (STANDING):  azithromycin  IVPB 500 milliGRAM(s) IV Intermittent every 24 hours  cefTRIAXone Injectable. 1000 milliGRAM(s) IV Push every 24 hours  cholecalciferol 1000 Unit(s) Oral daily  digoxin     Tablet 0.125 milliGRAM(s) Oral daily  multivitamin 1 Tablet(s) Oral daily  saccharomyces boulardii 250 milliGRAM(s) Oral two times a day  warfarin 3 milliGRAM(s) Oral once    MEDICATIONS  (PRN):  acetaminophen   Tablet 650 milliGRAM(s) Oral every 6 hours PRN For Temp greater than 38 C (100.4 F)  acetaminophen   Tablet. 650 milliGRAM(s) Oral every 6 hours PRN Headache or Bodyache  ALBUTerol    0.083% 2.5 milliGRAM(s) Nebulizer every 4 hours PRN Shortness of Breath and/or Wheezing  guaiFENesin   Syrup  (Sugar-Free) 100 milliGRAM(s) Oral every 6 hours PRN Cough

## 2018-04-27 NOTE — PROGRESS NOTE ADULT - ASSESSMENT
89 years old female with PMH of CHF, A. Fib on Coumadin, MV Repair, TIA and Breast Cancer brought by EMS with shortness of breath. As per patient, it started 2 days ago and is associated with productive cough. She went to see her PMD yesterday, who gave her Cefuroxime and Benzonatate She took 3 doses of antibiotics but her symptoms were worsening instead of improving so she came to the hospital. She is feeling weak and nauseous but denies vomiting, abdominal pain, headache, dizziness, fever, sick contacts or recent travel.  She was Code Sepsis in ER.     1) Community Acquired Pneumonia (likely strep/atypical bacteria)  - RVP Human metapneumovirus infection.   - Rocephin 1 gm and Zithromax 500 mg  - Florastor 250 mg    2) Sepsis  - Cultures sent and 250 cc IVF given. Hold further IVF given history of CHF.  - Repeat Lactate 2.5- 1.5 trending down.   - Got Vanco and Zosyn in ER. Will continue Rocephin and Zithromax for CAP    3) Systolic Heart Failure  - Hold Lasix and Atenolol for now.   - Continue Digoxin.  - Not on ACEI/ARB    4) Atrial Fibrillation  - Rate controlled  - Continue Digoxin   - Hold Atenolol secondary to Sepsis. Will restart if BP remains stable.  - INR supratherapeutic. Holding coumadin and to restart when INR normalize      5) Supratherapeutic INR  - No signs of bleeding  - Repeat INR in am    6) VERONIKA  - Hold Lasix  - Got 250 cc IVF.   - Monitor renal function.  - Avoid nephrotoxic medications    7) Anemia  - Unknown baseline  - Denies GI bleed  - Monitor H&H  DVT Prophylaxis -- Patient is on Coumadin and INR is supratherapeutic.     PT

## 2018-04-28 LAB
ANION GAP SERPL CALC-SCNC: 17 MMOL/L — SIGNIFICANT CHANGE UP (ref 5–17)
BUN SERPL-MCNC: 44 MG/DL — HIGH (ref 8–20)
CALCIUM SERPL-MCNC: 9.5 MG/DL — SIGNIFICANT CHANGE UP (ref 8.6–10.2)
CHLORIDE SERPL-SCNC: 91 MMOL/L — LOW (ref 98–107)
CK MB CFR SERPL CALC: 13.3 NG/ML — HIGH (ref 0–6.7)
CK SERPL-CCNC: 282 U/L — HIGH (ref 25–170)
CO2 SERPL-SCNC: 23 MMOL/L — SIGNIFICANT CHANGE UP (ref 22–29)
CREAT SERPL-MCNC: 1.68 MG/DL — HIGH (ref 0.5–1.3)
GLUCOSE SERPL-MCNC: 140 MG/DL — HIGH (ref 70–115)
HCT VFR BLD CALC: 33 % — LOW (ref 37–47)
HGB BLD-MCNC: 10.8 G/DL — LOW (ref 12–16)
INR BLD: 5.86 RATIO — CRITICAL HIGH (ref 0.88–1.16)
MCHC RBC-ENTMCNC: 31.1 PG — HIGH (ref 27–31)
MCHC RBC-ENTMCNC: 32.7 G/DL — SIGNIFICANT CHANGE UP (ref 32–36)
MCV RBC AUTO: 95.1 FL — SIGNIFICANT CHANGE UP (ref 81–99)
PLATELET # BLD AUTO: 168 K/UL — SIGNIFICANT CHANGE UP (ref 150–400)
POTASSIUM SERPL-MCNC: 4.6 MMOL/L — SIGNIFICANT CHANGE UP (ref 3.5–5.3)
POTASSIUM SERPL-SCNC: 4.6 MMOL/L — SIGNIFICANT CHANGE UP (ref 3.5–5.3)
PROTHROM AB SERPL-ACNC: 66.8 SEC — HIGH (ref 9.8–12.7)
RBC # BLD: 3.47 M/UL — LOW (ref 4.4–5.2)
RBC # FLD: 13.6 % — SIGNIFICANT CHANGE UP (ref 11–15.6)
SODIUM SERPL-SCNC: 131 MMOL/L — LOW (ref 135–145)
TROPONIN T SERPL-MCNC: 0.3 NG/ML — HIGH (ref 0–0.06)
WBC # BLD: 7.7 K/UL — SIGNIFICANT CHANGE UP (ref 4.8–10.8)
WBC # FLD AUTO: 7.7 K/UL — SIGNIFICANT CHANGE UP (ref 4.8–10.8)

## 2018-04-28 PROCEDURE — 71045 X-RAY EXAM CHEST 1 VIEW: CPT | Mod: 26

## 2018-04-28 PROCEDURE — 93010 ELECTROCARDIOGRAM REPORT: CPT

## 2018-04-28 PROCEDURE — 99233 SBSQ HOSP IP/OBS HIGH 50: CPT

## 2018-04-28 RX ORDER — OLANZAPINE 15 MG/1
5 TABLET, FILM COATED ORAL ONCE
Qty: 0 | Refills: 0 | Status: COMPLETED | OUTPATIENT
Start: 2018-04-28 | End: 2018-04-28

## 2018-04-28 RX ORDER — FUROSEMIDE 40 MG
40 TABLET ORAL ONCE
Qty: 0 | Refills: 0 | Status: COMPLETED | OUTPATIENT
Start: 2018-04-28 | End: 2018-04-28

## 2018-04-28 RX ORDER — FUROSEMIDE 40 MG
40 TABLET ORAL
Qty: 0 | Refills: 0 | Status: DISCONTINUED | OUTPATIENT
Start: 2018-04-28 | End: 2018-04-30

## 2018-04-28 RX ORDER — RISPERIDONE 4 MG/1
0.5 TABLET ORAL
Qty: 0 | Refills: 0 | Status: DISCONTINUED | OUTPATIENT
Start: 2018-04-28 | End: 2018-05-01

## 2018-04-28 RX ORDER — IPRATROPIUM/ALBUTEROL SULFATE 18-103MCG
3 AEROSOL WITH ADAPTER (GRAM) INHALATION EVERY 6 HOURS
Qty: 0 | Refills: 0 | Status: DISCONTINUED | OUTPATIENT
Start: 2018-04-28 | End: 2018-05-01

## 2018-04-28 RX ADMIN — CEFTRIAXONE 1000 MILLIGRAM(S): 500 INJECTION, POWDER, FOR SOLUTION INTRAMUSCULAR; INTRAVENOUS at 19:18

## 2018-04-28 RX ADMIN — AZITHROMYCIN 255 MILLIGRAM(S): 500 TABLET, FILM COATED ORAL at 17:01

## 2018-04-28 RX ADMIN — Medication 1000 UNIT(S): at 17:03

## 2018-04-28 RX ADMIN — Medication 3 MILLILITER(S): at 21:23

## 2018-04-28 RX ADMIN — Medication 1 TABLET(S): at 17:01

## 2018-04-28 RX ADMIN — Medication 250 MILLIGRAM(S): at 17:02

## 2018-04-28 RX ADMIN — Medication 40 MILLIGRAM(S): at 09:59

## 2018-04-28 RX ADMIN — Medication 40 MILLIGRAM(S): at 17:02

## 2018-04-28 RX ADMIN — Medication 250 MILLIGRAM(S): at 05:28

## 2018-04-28 RX ADMIN — Medication 0.12 MILLIGRAM(S): at 05:28

## 2018-04-28 RX ADMIN — ALBUTEROL 2.5 MILLIGRAM(S): 90 AEROSOL, METERED ORAL at 12:26

## 2018-04-28 RX ADMIN — OLANZAPINE 5 MILLIGRAM(S): 15 TABLET, FILM COATED ORAL at 17:01

## 2018-04-28 RX ADMIN — Medication 100 MILLIGRAM(S): at 17:02

## 2018-04-28 NOTE — CONSULT NOTE ADULT - SUBJECTIVE AND OBJECTIVE BOX
[facility], THE HEART CENTER                [address] [address]                                     PHONE: [phone]                                       FAX: [phone]  [email or web address]    Reason for Consult: acute decompensated heart failure     HPI:  89 year old woman with mitral valve prolapse status post repair 2001 with residual moderate to severe MR, normal coronary arteries, chronic atrial fibrillation on chronic anticoagulation with backup VVIR pacing, and breast cancer s/p lumpectomy who presents with progressive shortness of breath and productive cough with failure to improve with outpatient antibiotic therapy found to have sepsis secondary to human metapneumo viral infection and multifocal PNA as well as VERONIKA. She had clinical improvement overnight with increasing delirium and shortness of breath as well as increasing lower extremity edema. Cardiology is consulted for the evaluation of acute decompensated HF. She is confused currently and is a limited historian, but endorses shortness of breath but denies chest pain. per her son who is at bedside, her lower extremity edema has worsened since admission.     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  Test results are unchanged since previous exam of 6/16/15.      MEDICATIONS  (STANDING):  azithromycin  IVPB 500 milliGRAM(s) IV Intermittent every 24 hours  cefTRIAXone Injectable. 1000 milliGRAM(s) IV Push every 24 hours  cholecalciferol 1000 Unit(s) Oral daily  digoxin     Tablet 0.125 milliGRAM(s) Oral daily  furosemide   Injectable 40 milliGRAM(s) IV Push two times a day  multivitamin 1 Tablet(s) Oral daily  OLANZapine Injectable 5 milliGRAM(s) IntraMuscular once  saccharomyces boulardii 250 milliGRAM(s) Oral two times a day    FAMILY HISTORY:  Family history of breast cancer in sister (Sibling)  Family history of stroke (Sibling, Sibling)    SOCIAL HISTORY: confused, unable to obtain     ROS: limited 2/2 confusion     Vital Signs Last 24 Hrs  T(C): 36.8 (28 Apr 2018 11:09), Max: 37.6 (27 Apr 2018 21:43)  T(F): 98.3 (28 Apr 2018 11:09), Max: 99.7 (27 Apr 2018 21:43)  HR: 62 (28 Apr 2018 12:26) (62 - 90)  BP: 107/54 (28 Apr 2018 11:09) (106/60 - 147/74)  BP(mean): --  RR: 24 (28 Apr 2018 11:09) (18 - 24)  SpO2: 94% (28 Apr 2018 12:26) (94% - 97%)    PHYSICAL EXAM:  General: elderly woman, restless and confused   HEENT: Head; normocephalic, atraumatic. sclera anicteric, MMM, + JVD, neck is supple   CARDIOVASCULAR; 2/6 HARRIET at apex, no rub, gallop or lift. Normal S1 and S2.  LUNGS; Poor effort, scattered wheezing   ABDOMEN ; Soft, nontender without mass or organomegaly. bowel sounds normoactive.  EXTREMITIES; No clubbing, cyanosis, +2 LE edema. Distal pulses wnl. ROM normal.  SKIN; warm and dry with normal turgor.  NEURO; Alert/oriented x 1/normal motor exam.     PSYCH; normal affect.        I&O's Detail    27 Apr 2018 07:01  -  28 Apr 2018 07:00  --------------------------------------------------------  IN:    Oral Fluid: 400 mL  Total IN: 400 mL    OUT:  Total OUT: 0 mL    Total NET: 400 mL        LABS:                        10.8   7.7   )-----------( 168      ( 28 Apr 2018 07:28 )             33.0     04-28    131<L>  |  91<L>  |  44.0<H>  ----------------------------<  140<H>  4.6   |  23.0  |  1.68<H>    Ca    9.5      28 Apr 2018 07:28      CARDIAC MARKERS ( 28 Apr 2018 10:30 )  x     / 0.30 ng/mL / 282 U/L / x     / 13.3 ng/mL      PT/INR - ( 28 Apr 2018 10:31 )   PT: 66.8 sec;   INR: 5.86 ratio          I&O's Summary    27 Apr 2018 07:01  -  28 Apr 2018 07:00  --------------------------------------------------------  IN: 400 mL / OUT: 0 mL / NET: 400 mL        RADIOLOGY & ADDITIONAL STUDIES:  < from: Xray Chest 1 View- PORTABLE-Urgent (04.28.18 @ 10:59) >  Small left pleural effusion with worsening consolidation in both lungs, possibly infection.    <

## 2018-04-28 NOTE — PROGRESS NOTE ADULT - SUBJECTIVE AND OBJECTIVE BOX
CC: Confusion delirium today.  Viral syndrome from human metapneumo viral infection. Multifocal pneumonia. Supratherapeutic INR 5.86 , was on coumadin for afib. Coumadin held for past 2 days.  History of breast cancer.  CHF aileen leg edema.  blood cx no growth. Urine cx no growth.  Renal insufficiency.     HPI:  89 years old female with PMH of CHF, A. Fib on Coumadin, MV Repair, TIA and Breast Cancer brought by EMS with shortness of breath. As per patient, it started 2 days ago and is associated with productive cough. She went to see her PMD yesterday, who gave her Cefuroxime and Benzonatate She took 3 doses of antibiotics but her symptoms were worsening instead of improving so she came to the hospital. She is feeling weak and nauseous but denies vomiting, abdominal pain, headache, dizziness, fever, sick contacts or recent travel.  She was Code Sepsis in ER. (26 Apr 2018 02:39)    REVIEW OF SYSTEMS:    Patient denied fever, chills, abdominal pain, nausea, vomiting, cough, shortness of breath, chest pain or palpitations    Vital Signs Last 24 Hrs  T(C): 36.8 (28 Apr 2018 11:09), Max: 37.6 (27 Apr 2018 21:43)  T(F): 98.3 (28 Apr 2018 11:09), Max: 99.7 (27 Apr 2018 21:43)  HR: 64 (28 Apr 2018 11:09) (62 - 90)  BP: 107/54 (28 Apr 2018 11:09) (100/60 - 147/74)  BP(mean): --  RR: 24 (28 Apr 2018 11:09) (18 - 24)  SpO2: 94% (28 Apr 2018 11:09) (94% - 97%)I&O's Summary    27 Apr 2018 07:01  -  28 Apr 2018 07:00  --------------------------------------------------------  IN: 400 mL / OUT: 0 mL / NET: 400 mL      PHYSICAL EXAM:  GENERAL: NAD,   HEENT: PERRL, +EOMI, anicteric, no [place]  NECK: Supple, No JVD   CHEST/LUNG: bilateral rales   HEART: S1S2 Normal intensity, no murmurs, gallops or rubs noted  ABDOMEN: Soft, BS Normoactive, NT, ND, no HSM noted  EXTREMITIES:  2+ radial and DP pulses noted, no clubbing, cyanosis, or edema noted, Limited mobility.   SKIN: No rashes or lesions noted  NEURO: Confused delirious , no focal deficits noted, CN II-XII intact  PSYCH: Depressed  mood and affect; insight/judgement appropriate  LABS:                        10.8   7.7   )-----------( 168      ( 28 Apr 2018 07:28 )             33.0     04-28    131<L>  |  91<L>  |  44.0<H>  ----------------------------<  140<H>  4.6   |  23.0  |  1.68<H>    Ca    9.5      28 Apr 2018 07:28      PT/INR - ( 28 Apr 2018 10:31 )   PT: 66.8 sec;   INR: 5.86 ratio             RADIOLOGY & ADDITIONAL TESTS:    MEDICATIONS:  MEDICATIONS  (STANDING):  azithromycin  IVPB 500 milliGRAM(s) IV Intermittent every 24 hours  cefTRIAXone Injectable. 1000 milliGRAM(s) IV Push every 24 hours  cholecalciferol 1000 Unit(s) Oral daily  digoxin     Tablet 0.125 milliGRAM(s) Oral daily  multivitamin 1 Tablet(s) Oral daily  OLANZapine Injectable 5 milliGRAM(s) IntraMuscular once  saccharomyces boulardii 250 milliGRAM(s) Oral two times a day    MEDICATIONS  (PRN):  acetaminophen   Tablet 650 milliGRAM(s) Oral every 6 hours PRN For Temp greater than 38 C (100.4 F)  acetaminophen   Tablet. 650 milliGRAM(s) Oral every 6 hours PRN Headache or Bodyache  ALBUTerol    0.083% 2.5 milliGRAM(s) Nebulizer every 4 hours PRN Shortness of Breath and/or Wheezing  benzocaine 15 mG/menthol 3.6 mG Lozenge 1 Lozenge Oral every 3 hours PRN Sore Throat  guaiFENesin   Syrup  (Sugar-Free) 100 milliGRAM(s) Oral every 6 hours PRN Cough

## 2018-04-28 NOTE — PROGRESS NOTE ADULT - ASSESSMENT
89 years old female with PMH of CHF, A. Fib on Coumadin, MV Repair, TIA and Breast Cancer brought by EMS with shortness of breath. As per patient, it started 2 days ago and is associated with productive cough. She went to see her PMD yesterday, who gave her Cefuroxime and Benzonatate She took 3 doses of antibiotics but her symptoms were worsening instead of improving so she came to the hospital. She is feeling weak and nauseous but denies vomiting, abdominal pain, headache, dizziness, fever, sick contacts or recent travel.  She was Code Sepsis in ER.     1) Community Acquired Pneumonia (likely strep/atypical bacteria)  - RVP Human metapneumovirus infection.   - Rocephin 1 gm and Zithromax 500 mg  - Florastor 250 mg    2) Sepsis  - Blood cx, Urine cx no growth.  - Continue Rocephin and Zithromax for multifocal pneumonia    3) Systolic Heart Failure  - Restart Lasix. Cardiology recommend 40mg iv bid for now  - Not on ACEI/ARB    4) Atrial Fibrillation  - Rate controlled  - Hold Atenolol secondary to Sepsis. Will restart if BP remains stable.  - INR supratherapeutic. Holding coumadin and to restart when INR normalize    5) Supratherapeutic INR  - No signs of bleeding  - Repeat INR in am    6) VERONIKA  - Monitor renal function.  - Avoid nephrotoxic medications    7) Anemia  - Unknown baseline  - Denies GI bleed  - Monitor H&H    DVT Prophylaxis -- Patient is on Coumadin and INR is supratherapeutic.     PT

## 2018-04-29 DIAGNOSIS — J15.9 UNSPECIFIED BACTERIAL PNEUMONIA: ICD-10-CM

## 2018-04-29 LAB
ALBUMIN SERPL ELPH-MCNC: 3.4 G/DL — SIGNIFICANT CHANGE UP (ref 3.3–5.2)
ALP SERPL-CCNC: 83 U/L — SIGNIFICANT CHANGE UP (ref 40–120)
ALT FLD-CCNC: 87 U/L — HIGH
ANION GAP SERPL CALC-SCNC: 15 MMOL/L — SIGNIFICANT CHANGE UP (ref 5–17)
APTT BLD: 40.9 SEC — HIGH (ref 27.5–37.4)
AST SERPL-CCNC: 118 U/L — HIGH
BILIRUB SERPL-MCNC: 0.5 MG/DL — SIGNIFICANT CHANGE UP (ref 0.4–2)
BUN SERPL-MCNC: 53 MG/DL — HIGH (ref 8–20)
CALCIUM SERPL-MCNC: 9.5 MG/DL — SIGNIFICANT CHANGE UP (ref 8.6–10.2)
CHLORIDE SERPL-SCNC: 94 MMOL/L — LOW (ref 98–107)
CHOLEST SERPL-MCNC: 141 MG/DL — SIGNIFICANT CHANGE UP (ref 110–199)
CO2 SERPL-SCNC: 27 MMOL/L — SIGNIFICANT CHANGE UP (ref 22–29)
CREAT SERPL-MCNC: 1.78 MG/DL — HIGH (ref 0.5–1.3)
GLUCOSE SERPL-MCNC: 125 MG/DL — HIGH (ref 70–115)
HCT VFR BLD CALC: 33.2 % — LOW (ref 37–47)
HDLC SERPL-MCNC: 41 MG/DL — LOW
HGB BLD-MCNC: 10.7 G/DL — LOW (ref 12–16)
INR BLD: 7.31 RATIO — CRITICAL HIGH (ref 0.88–1.16)
LIPID PNL WITH DIRECT LDL SERPL: 67 MG/DL — SIGNIFICANT CHANGE UP
MCHC RBC-ENTMCNC: 30.4 PG — SIGNIFICANT CHANGE UP (ref 27–31)
MCHC RBC-ENTMCNC: 32.2 G/DL — SIGNIFICANT CHANGE UP (ref 32–36)
MCV RBC AUTO: 94.3 FL — SIGNIFICANT CHANGE UP (ref 81–99)
PLATELET # BLD AUTO: 207 K/UL — SIGNIFICANT CHANGE UP (ref 150–400)
POTASSIUM SERPL-MCNC: 3.8 MMOL/L — SIGNIFICANT CHANGE UP (ref 3.5–5.3)
POTASSIUM SERPL-SCNC: 3.8 MMOL/L — SIGNIFICANT CHANGE UP (ref 3.5–5.3)
PROT SERPL-MCNC: 5.8 G/DL — LOW (ref 6.6–8.7)
PROTHROM AB SERPL-ACNC: 83.8 SEC — HIGH (ref 9.8–12.7)
RBC # BLD: 3.52 M/UL — LOW (ref 4.4–5.2)
RBC # FLD: 13.9 % — SIGNIFICANT CHANGE UP (ref 11–15.6)
SODIUM SERPL-SCNC: 136 MMOL/L — SIGNIFICANT CHANGE UP (ref 135–145)
TOTAL CHOLESTEROL/HDL RATIO MEASUREMENT: 3 RATIO — LOW (ref 3.3–7.1)
TRIGL SERPL-MCNC: 165 MG/DL — SIGNIFICANT CHANGE UP (ref 10–200)
WBC # BLD: 8.3 K/UL — SIGNIFICANT CHANGE UP (ref 4.8–10.8)
WBC # FLD AUTO: 8.3 K/UL — SIGNIFICANT CHANGE UP (ref 4.8–10.8)

## 2018-04-29 PROCEDURE — 99223 1ST HOSP IP/OBS HIGH 75: CPT

## 2018-04-29 PROCEDURE — 99233 SBSQ HOSP IP/OBS HIGH 50: CPT

## 2018-04-29 RX ORDER — SODIUM CHLORIDE 9 MG/ML
250 INJECTION INTRAMUSCULAR; INTRAVENOUS; SUBCUTANEOUS ONCE
Qty: 0 | Refills: 0 | Status: COMPLETED | OUTPATIENT
Start: 2018-04-29 | End: 2018-04-29

## 2018-04-29 RX ORDER — ACETAMINOPHEN 500 MG
1000 TABLET ORAL ONCE
Qty: 0 | Refills: 0 | Status: COMPLETED | OUTPATIENT
Start: 2018-04-29 | End: 2018-04-29

## 2018-04-29 RX ORDER — ACETAMINOPHEN 500 MG
1000 TABLET ORAL ONCE
Qty: 0 | Refills: 0 | Status: DISCONTINUED | OUTPATIENT
Start: 2018-04-29 | End: 2018-04-29

## 2018-04-29 RX ORDER — VANCOMYCIN HCL 1 G
500 VIAL (EA) INTRAVENOUS EVERY 12 HOURS
Qty: 0 | Refills: 0 | Status: DISCONTINUED | OUTPATIENT
Start: 2018-04-29 | End: 2018-04-29

## 2018-04-29 RX ORDER — PHYTONADIONE (VIT K1) 5 MG
5 TABLET ORAL ONCE
Qty: 0 | Refills: 0 | Status: COMPLETED | OUTPATIENT
Start: 2018-04-29 | End: 2018-04-29

## 2018-04-29 RX ORDER — PIPERACILLIN AND TAZOBACTAM 4; .5 G/20ML; G/20ML
3.38 INJECTION, POWDER, LYOPHILIZED, FOR SOLUTION INTRAVENOUS EVERY 8 HOURS
Qty: 0 | Refills: 0 | Status: DISCONTINUED | OUTPATIENT
Start: 2018-04-29 | End: 2018-05-01

## 2018-04-29 RX ORDER — VANCOMYCIN HCL 1 G
500 VIAL (EA) INTRAVENOUS EVERY 24 HOURS
Qty: 0 | Refills: 0 | Status: DISCONTINUED | OUTPATIENT
Start: 2018-04-29 | End: 2018-05-01

## 2018-04-29 RX ADMIN — Medication 100 MILLIGRAM(S): at 14:06

## 2018-04-29 RX ADMIN — Medication 250 MILLIGRAM(S): at 06:18

## 2018-04-29 RX ADMIN — Medication 40 MILLIGRAM(S): at 06:19

## 2018-04-29 RX ADMIN — Medication 400 MILLIGRAM(S): at 16:09

## 2018-04-29 RX ADMIN — Medication 3 MILLILITER(S): at 10:15

## 2018-04-29 RX ADMIN — PIPERACILLIN AND TAZOBACTAM 25 GRAM(S): 4; .5 INJECTION, POWDER, LYOPHILIZED, FOR SOLUTION INTRAVENOUS at 20:51

## 2018-04-29 RX ADMIN — PIPERACILLIN AND TAZOBACTAM 25 GRAM(S): 4; .5 INJECTION, POWDER, LYOPHILIZED, FOR SOLUTION INTRAVENOUS at 14:06

## 2018-04-29 RX ADMIN — Medication 3 MILLILITER(S): at 21:04

## 2018-04-29 RX ADMIN — Medication 0.12 MILLIGRAM(S): at 06:18

## 2018-04-29 RX ADMIN — Medication 101 MILLIGRAM(S): at 20:51

## 2018-04-29 RX ADMIN — Medication 3 MILLILITER(S): at 16:22

## 2018-04-29 RX ADMIN — RISPERIDONE 0.5 MILLIGRAM(S): 4 TABLET ORAL at 06:18

## 2018-04-29 RX ADMIN — Medication 3 MILLILITER(S): at 04:03

## 2018-04-29 RX ADMIN — SODIUM CHLORIDE 1000 MILLILITER(S): 9 INJECTION INTRAMUSCULAR; INTRAVENOUS; SUBCUTANEOUS at 15:30

## 2018-04-29 NOTE — CONSULT NOTE ADULT - PROBLEM SELECTOR RECOMMENDATION 9
-Will stop ceftriaxone and azithromycin   -Legionella neg, no improvement with azithromycin anyway, unlikely legionaire disease.  -send sputum culture if possible  -Will cover pseudomonas and MRSA   -Will start zosyn 3.375 q8h (regular dose 4.5gm but due to her age lower dose given)  -Start vancomycin 500mg q12h for now, send vanco trough tomorrow night prior to 4th dose, target 15-20.

## 2018-04-29 NOTE — CONSULT NOTE ADULT - SUBJECTIVE AND OBJECTIVE BOX
[facility]  INFECTIOUS DISEASES AND INTERNAL MEDICINE at [place]  =======================================================  Jos Faulkner MD  Diplomates American Board of Internal Medicine and Infectious Diseases  =======================================================      [id number]  ROCCO VIDAL     CC: Shortness of breath     HPI:  89 y/o woman with PMH of CHF, A. Fib on Coumadin, MV Repair, TIA and Breast Cancer was admitted on 4/26 with multilobar pneumonia. His viral panel was positive for metapneumovirus. Started on Cefuroxime by PMD with worsening of her condition so she was brought to [facility].    As per his son who is at bedside, she was completely alert and oriented on 25th when they went out for lunch. After that she started getting confused and disoriented.   She was started on ceftriaxone and azithromycin with worsening of consolidation in lungs and no improvement in her condition.   Today she is disoriented and unable to give appropriate answer to questions.       Past Medical & Surgical Hx:  PAST MEDICAL & SURGICAL HISTORY:  TIA (transient ischemic attack)  MVP (mitral valve prolapse)  Breast CA, right: lumpectomy  Pacemaker  Afib  H/O hernia repair  S/P mastectomy, right  S/P cataract surgery, right  S/P hysterectomy  S/P cholecystectomy: with AP  S/P mitral valve repair: PPM    Social Hx: unkonwn    FAMILY HISTORY:  Family history of breast cancer in sister (Sibling)  Family history of stroke (Sibling, Sibling)      Allergies  No Known Allergies    ANTIBIOTICS:   stopped ceftriaxone+azithromycin and started zosyn and vancomycin      REVIEW OF SYSTEMS:  unable to obtain     Physical Exam:  Vital Signs Last 24 Hrs  T(C): 36.9 (29 Apr 2018 09:46), Max: 36.9 (28 Apr 2018 16:40)  T(F): 98.5 (29 Apr 2018 09:46), Max: 98.5 (29 Apr 2018 05:32)  HR: 84 (29 Apr 2018 09:46) (62 - 84)  BP: 110/60 (29 Apr 2018 09:46) (101/59 - 123/74)  BP(mean): --  RR: 28 (29 Apr 2018 09:46) (20 - 28)  SpO2: 90% (29 Apr 2018 09:46) (86% - 94%)  GEN: not alert or oriented but awake, with o2 mask  HEENT: normocephalic and atraumatic.     NECK: Supple.  LUNGS: ronchi and rales bilaterally scattered in both lungs  HEART: Regular rate and rhythm 3/6 systolic murmur, pace maker in place left side of chest nontendre  ABDOMEN: Soft, nontender, and nondistended.  Positive bowel sounds.    EXTREMITIES: Without any cyanosis, clubbing, rash, lesions or edema.  MSK: No joint swelling  NEUROLOGIC: unable to obtain  SKIN: No ulceration or induration present.        Labs:  04-29    136  |  94<L>  |  53.0<H>  ----------------------------<  125<H>  3.8   |  27.0  |  1.78<H>    Ca    9.5      29 Apr 2018 07:19    TPro  5.8<L>  /  Alb  3.4  /  TBili  0.5  /  DBili  x   /  AST  118<H>  /  ALT  87<H>  /  AlkPhos  83  04-29                   10.7   8.3   )-----------( 207      ( 29 Apr 2018 07:19 )             33.2   PT/INR - ( 28 Apr 2018 10:31 )   PT: 66.8 sec;   INR: 5.86 ratio      LIVER FUNCTIONS - ( 29 Apr 2018 07:19 )  Alb: 3.4 g/dL / Pro: 5.8 g/dL / ALK PHOS: 83 U/L / ALT: 87 U/L / AST: 118 U/L / GGT: x           CARDIAC MARKERS ( 28 Apr 2018 10:30 )  x     / 0.30 ng/mL / 282 U/L / x     / 13.3 ng/mL    RECENT CULTURES:  04-26 @ 15:18 .Blood Blood     No growth at 48 hours    04-26 @ 03:26 .Urine Clean Catch (Midstream)     No growth  Detected  04-26 @ 00:35 .Blood Blood     No growth at 48 hours    04-26 @ 00:34 .Blood Blood     No growth at 48 hours

## 2018-04-29 NOTE — PROGRESS NOTE ADULT - SUBJECTIVE AND OBJECTIVE BOX
CC: Very lethargic , delirious. Shortness of breath. Rectal temp 100.5. Not able tolerate per oral.   HPI:  89 years old female with PMH of CHF, A. Fib on Coumadin, MV Repair, TIA and Breast Cancer brought by EMS with shortness of breath. As per patient, it started 2 days ago and is associated with productive cough. She went to see her PMD yesterday, who gave her Cefuroxime and Benzonatate She took 3 doses of antibiotics but her symptoms were worsening instead of improving so she came to the hospital. She is feeling weak and nauseous but denies vomiting, abdominal pain, headache, dizziness, fever, sick contacts or recent travel.  She was Code Sepsis in ER. (26 Apr 2018 02:39)    REVIEW OF SYSTEMS:    Patient denied fever, chills, abdominal pain, nausea, vomiting, cough, shortness of breath, chest pain or palpitations    Vital Signs Last 24 Hrs  T(C): 36.9 (29 Apr 2018 09:46), Max: 36.9 (28 Apr 2018 16:40)  T(F): 98.5 (29 Apr 2018 09:46), Max: 98.5 (29 Apr 2018 05:32)  HR: 89 (29 Apr 2018 10:34) (62 - 89)  BP: 110/60 (29 Apr 2018 09:46) (101/59 - 123/74)  BP(mean): --  RR: 28 (29 Apr 2018 09:46) (20 - 28)  SpO2: 90% (29 Apr 2018 10:34) (86% - 93%)I&O's Summary    28 Apr 2018 07:01  -  29 Apr 2018 07:00  --------------------------------------------------------  IN: 0 mL / OUT: 1 mL / NET: -1 mL      PHYSICAL EXAM:  GENERAL: Lethargy  HEENT: PERRL, +EOMI, anicteric, no [place]  NECK: Supple, No JVD   CHEST/LUNG: bilateral rales   HEART: S1S2 Normal intensity, no murmurs, gallops or rubs noted  ABDOMEN: Soft, BS Normoactive, NT, ND, no HSM noted  EXTREMITIES:  2+ radial and DP pulses noted, no clubbing, cyanosis, or edema noted, Bed bound .   SKIN: No rashes or lesions noted  NEURO: Nonverbal , no focal deficits noted, CN II-XII intact  PSYCH: Confused , delirious. Inappropriate  LABS:                        10.7   8.3   )-----------( 207      ( 29 Apr 2018 07:19 )             33.2     04-29    136  |  94<L>  |  53.0<H>  ----------------------------<  125<H>  3.8   |  27.0  |  1.78<H>    Ca    9.5      29 Apr 2018 07:19    TPro  5.8<L>  /  Alb  3.4  /  TBili  0.5  /  DBili  x   /  AST  118<H>  /  ALT  87<H>  /  AlkPhos  83  04-29    PT/INR - ( 28 Apr 2018 10:31 )   PT: 66.8 sec;   INR: 5.86 ratio             RADIOLOGY & ADDITIONAL TESTS:    MEDICATIONS:  MEDICATIONS  (STANDING):  ALBUTerol/ipratropium for Nebulization 3 milliLiter(s) Nebulizer every 6 hours  cholecalciferol 1000 Unit(s) Oral daily  digoxin     Tablet 0.125 milliGRAM(s) Oral daily  furosemide   Injectable 40 milliGRAM(s) IV Push two times a day  multivitamin 1 Tablet(s) Oral daily  piperacillin/tazobactam IVPB. 3.375 Gram(s) IV Intermittent every 8 hours  risperiDONE   Tablet 0.5 milliGRAM(s) Oral two times a day  saccharomyces boulardii 250 milliGRAM(s) Oral two times a day  vancomycin  IVPB 500 milliGRAM(s) IV Intermittent every 24 hours    MEDICATIONS  (PRN):  acetaminophen   Tablet 650 milliGRAM(s) Oral every 6 hours PRN For Temp greater than 38 C (100.4 F)  acetaminophen   Tablet. 650 milliGRAM(s) Oral every 6 hours PRN Headache or Bodyache  ALBUTerol    0.083% 2.5 milliGRAM(s) Nebulizer every 4 hours PRN Shortness of Breath and/or Wheezing  benzocaine 15 mG/menthol 3.6 mG Lozenge 1 Lozenge Oral every 3 hours PRN Sore Throat  guaiFENesin   Syrup  (Sugar-Free) 100 milliGRAM(s) Oral every 6 hours PRN Cough

## 2018-04-29 NOTE — PROVIDER CONTACT NOTE (CRITICAL VALUE NOTIFICATION) - ACTION/TREATMENT ORDERED:
Dr. Link made aware. Will continue to monitor.
MD to order vitamin K
no new orders at this time. pt in alone in room currently. Dr. Macedo to make Dr. Perez aware.

## 2018-04-29 NOTE — PROGRESS NOTE ADULT - SUBJECTIVE AND OBJECTIVE BOX
[place] [facility], THE HEART CENTER                                   [address] [place] [address]                                                      PHONE: [phone]                                                         FAX: [phone]  [email or web address]  ---------------------------------------------------------------------------------------------------------------------------------    Overnight events/patient complaints: Continues to be delirious No acute concerns currently     INTERPRETATION OF TELEMETRY (personally reviewed): no significant events     MEDICATIONS  (STANDING):  ALBUTerol/ipratropium for Nebulization 3 milliLiter(s) Nebulizer every 6 hours  cholecalciferol 1000 Unit(s) Oral daily  digoxin     Tablet 0.125 milliGRAM(s) Oral daily  furosemide   Injectable 40 milliGRAM(s) IV Push two times a day  multivitamin 1 Tablet(s) Oral daily  piperacillin/tazobactam IVPB. 3.375 Gram(s) IV Intermittent every 8 hours  risperiDONE   Tablet 0.5 milliGRAM(s) Oral two times a day  saccharomyces boulardii 250 milliGRAM(s) Oral two times a day  vancomycin  IVPB 500 milliGRAM(s) IV Intermittent every 12 hours    MEDICATIONS  (PRN):  acetaminophen   Tablet 650 milliGRAM(s) Oral every 6 hours PRN For Temp greater than 38 C (100.4 F)  acetaminophen   Tablet. 650 milliGRAM(s) Oral every 6 hours PRN Headache or Bodyache  ALBUTerol    0.083% 2.5 milliGRAM(s) Nebulizer every 4 hours PRN Shortness of Breath and/or Wheezing  benzocaine 15 mG/menthol 3.6 mG Lozenge 1 Lozenge Oral every 3 hours PRN Sore Throat  guaiFENesin   Syrup  (Sugar-Free) 100 milliGRAM(s) Oral every 6 hours PRN Cough      Vital Signs Last 24 Hrs  T(C): 36.9 (29 Apr 2018 09:46), Max: 36.9 (28 Apr 2018 16:40)  T(F): 98.5 (29 Apr 2018 09:46), Max: 98.5 (29 Apr 2018 05:32)  HR: 89 (29 Apr 2018 10:34) (62 - 89)  BP: 110/60 (29 Apr 2018 09:46) (101/59 - 123/74)  BP(mean): --  RR: 28 (29 Apr 2018 09:46) (20 - 28)  SpO2: 90% (29 Apr 2018 10:34) (86% - 94%)  ICU Vital Signs Last 24 Hrs    PHYSICAL EXAM:  General: elderly woman, resting comfortably, confused   HEENT: Head; normocephalic, atraumatic. sclera anicteric, MMM, + JVD, neck is supple   CARDIOVASCULAR; 2/6 HARRIET at apex, no rub, gallop or lift. Normal S1 and S2.  LUNGS; Poor effort, scattered wheezing   ABDOMEN ; Soft, nontender without mass or organomegaly. bowel sounds normoactive.  EXTREMITIES; No clubbing, cyanosis, +2 LE edema. Distal pulses wnl. ROM normal.  SKIN; warm and dry with normal turgor.  NEURO; Alert/oriented x 1/normal motor exam.     PSYCH; normal affect.        LABS:                        10.7   8.3   )-----------( 207      ( 29 Apr 2018 07:19 )             33.2     04-29    136  |  94<L>  |  53.0<H>  ----------------------------<  125<H>  3.8   |  27.0  |  1.78<H>    Ca    9.5      29 Apr 2018 07:19    TPro  5.8<L>  /  Alb  3.4  /  TBili  0.5  /  DBili  x   /  AST  118<H>  /  ALT  87<H>  /  AlkPhos  83  04-29    ROCCO VIDAL  CARDIAC MARKERS ( 28 Apr 2018 10:30 )  x     / 0.30 ng/mL / 282 U/L / x     / 13.3 ng/mL      PT/INR - ( 28 Apr 2018 10:31 )   PT: 66.8 sec;   INR: 5.86 ratio        ASSESSMENT AND PLAN:  In summary, ROCCO VIDAL is a 89 year old woman with mitral valve prolapse status post repair 2001 with residual moderate to severe MR, normal coronary arteries, chronic atrial fibrillation on chronic anticoagulation with backup VVIR pacing, and breast cancer s/p lumpectomy who presents with progressive shortness of breath and productive cough with failure to improve with outpatient antibiotic therapy found to have sepsis secondary to human metapneumo viral infection and multifocal PNA as well as VERONIKA. Course is now complicated by delirium and worsening shortness of breath.   CXR with worsening consolidations     Shortness of breath  - multifactorial including multifocal PNA and acute decompensated HFpEF (NYHA class III, AHA stage C) in the background of moderate to severe MR  - continue antibiotics per primary team  - de-escalate diuresis to 40mg PO daily   - pending repeat TTE to assess for interval changes in valvular heart disease and reassessment of LVEF   - repeat BNP  - strict I/O and daily weights    Atrial fibrillation   [id number]- Vasc 7  - INR supratherapeutic; hold coumadin   - rate is controlled  - continue to hold atenolol given VERONIKA and borderline BP  - telemetry monitoring       Thank you for allowing [facility] to participate in the care of this patient.  Please feel free to call with any questions or concerns.

## 2018-04-30 LAB
ANION GAP SERPL CALC-SCNC: 13 MMOL/L — SIGNIFICANT CHANGE UP (ref 5–17)
BUN SERPL-MCNC: 63 MG/DL — HIGH (ref 8–20)
CALCIUM SERPL-MCNC: 9.1 MG/DL — SIGNIFICANT CHANGE UP (ref 8.6–10.2)
CHLORIDE SERPL-SCNC: 99 MMOL/L — SIGNIFICANT CHANGE UP (ref 98–107)
CO2 SERPL-SCNC: 30 MMOL/L — HIGH (ref 22–29)
CREAT SERPL-MCNC: 2.01 MG/DL — HIGH (ref 0.5–1.3)
GLUCOSE SERPL-MCNC: 130 MG/DL — HIGH (ref 70–115)
HCT VFR BLD CALC: 33.1 % — LOW (ref 37–47)
HGB BLD-MCNC: 10.4 G/DL — LOW (ref 12–16)
MCHC RBC-ENTMCNC: 30.6 PG — SIGNIFICANT CHANGE UP (ref 27–31)
MCHC RBC-ENTMCNC: 31.4 G/DL — LOW (ref 32–36)
MCV RBC AUTO: 97.4 FL — SIGNIFICANT CHANGE UP (ref 81–99)
PLATELET # BLD AUTO: 188 K/UL — SIGNIFICANT CHANGE UP (ref 150–400)
POTASSIUM SERPL-MCNC: 3.9 MMOL/L — SIGNIFICANT CHANGE UP (ref 3.5–5.3)
POTASSIUM SERPL-SCNC: 3.9 MMOL/L — SIGNIFICANT CHANGE UP (ref 3.5–5.3)
RBC # BLD: 3.4 M/UL — LOW (ref 4.4–5.2)
RBC # FLD: 14 % — SIGNIFICANT CHANGE UP (ref 11–15.6)
SODIUM SERPL-SCNC: 142 MMOL/L — SIGNIFICANT CHANGE UP (ref 135–145)
WBC # BLD: 6.6 K/UL — SIGNIFICANT CHANGE UP (ref 4.8–10.8)
WBC # FLD AUTO: 6.6 K/UL — SIGNIFICANT CHANGE UP (ref 4.8–10.8)

## 2018-04-30 PROCEDURE — 99233 SBSQ HOSP IP/OBS HIGH 50: CPT

## 2018-04-30 PROCEDURE — 99223 1ST HOSP IP/OBS HIGH 75: CPT

## 2018-04-30 PROCEDURE — 71045 X-RAY EXAM CHEST 1 VIEW: CPT | Mod: 26

## 2018-04-30 RX ORDER — SODIUM CHLORIDE 9 MG/ML
1000 INJECTION INTRAMUSCULAR; INTRAVENOUS; SUBCUTANEOUS
Qty: 0 | Refills: 0 | Status: DISCONTINUED | OUTPATIENT
Start: 2018-04-30 | End: 2018-05-01

## 2018-04-30 RX ORDER — HALOPERIDOL DECANOATE 100 MG/ML
0.5 INJECTION INTRAMUSCULAR EVERY 6 HOURS
Qty: 0 | Refills: 0 | Status: DISCONTINUED | OUTPATIENT
Start: 2018-04-30 | End: 2018-05-01

## 2018-04-30 RX ORDER — HALOPERIDOL DECANOATE 100 MG/ML
1 INJECTION INTRAMUSCULAR EVERY 6 HOURS
Qty: 0 | Refills: 0 | Status: DISCONTINUED | OUTPATIENT
Start: 2018-04-30 | End: 2018-04-30

## 2018-04-30 RX ORDER — METOPROLOL TARTRATE 50 MG
2.5 TABLET ORAL EVERY 6 HOURS
Qty: 0 | Refills: 0 | Status: DISCONTINUED | OUTPATIENT
Start: 2018-04-30 | End: 2018-05-01

## 2018-04-30 RX ORDER — HALOPERIDOL DECANOATE 100 MG/ML
0.5 INJECTION INTRAMUSCULAR ONCE
Qty: 0 | Refills: 0 | Status: COMPLETED | OUTPATIENT
Start: 2018-04-30 | End: 2018-04-30

## 2018-04-30 RX ADMIN — RISPERIDONE 0.5 MILLIGRAM(S): 4 TABLET ORAL at 05:56

## 2018-04-30 RX ADMIN — Medication 3 MILLILITER(S): at 09:30

## 2018-04-30 RX ADMIN — HALOPERIDOL DECANOATE 0.5 MILLIGRAM(S): 100 INJECTION INTRAMUSCULAR at 09:07

## 2018-04-30 RX ADMIN — Medication 1000 UNIT(S): at 15:23

## 2018-04-30 RX ADMIN — Medication 1 TABLET(S): at 15:23

## 2018-04-30 RX ADMIN — PIPERACILLIN AND TAZOBACTAM 25 GRAM(S): 4; .5 INJECTION, POWDER, LYOPHILIZED, FOR SOLUTION INTRAVENOUS at 15:22

## 2018-04-30 RX ADMIN — Medication 40 MILLIGRAM(S): at 09:08

## 2018-04-30 RX ADMIN — Medication 3 MILLILITER(S): at 03:39

## 2018-04-30 RX ADMIN — Medication 0.12 MILLIGRAM(S): at 05:56

## 2018-04-30 RX ADMIN — Medication 100 MILLIGRAM(S): at 15:35

## 2018-04-30 RX ADMIN — Medication 3 MILLILITER(S): at 21:37

## 2018-04-30 RX ADMIN — Medication 3 MILLILITER(S): at 15:55

## 2018-04-30 RX ADMIN — SODIUM CHLORIDE 75 MILLILITER(S): 9 INJECTION INTRAMUSCULAR; INTRAVENOUS; SUBCUTANEOUS at 12:50

## 2018-04-30 RX ADMIN — PIPERACILLIN AND TAZOBACTAM 25 GRAM(S): 4; .5 INJECTION, POWDER, LYOPHILIZED, FOR SOLUTION INTRAVENOUS at 05:56

## 2018-04-30 NOTE — CHART NOTE - NSCHARTNOTEFT_GEN_A_CORE
Rapid Response Note:     89 years old female with PMH of CHF, A. Fib on Coumadin, MV Repair, TIA and Breast Cancer brought by EMS with shortness of breath was admitted     As per patient, it started 2 days ago    and is associated with productive cough. She went to see her PMD yesterday, who gave her Cefuroxime and Benzonatate She took 3 doses of antibiotics but her symptoms were worsening instead of improving so she came to the hospital. She is feeling weak and nauseous but denies vomiting, abdominal pain, headache, dizziness, fever, sick contacts or recent travel.  She was Code Sepsis in ER. (26 Apr 2018 02:39) Rapid Response Note:     89 years old female with PMH of CHF, A. Fib on Coumadin, MV Repair, TIA and Breast Cancer brought by EMS with shortness of breath was admitted for sepsis secondary to multilobar PNA.  She was started on Vanco/Zosyn and then switched to Azithro/Rocephin.  Rapid was called because Ms. Emmanuel pulled off her venti mask and de-sated to 84% on room air.      Vitals: SpO2 89% on 50% venti mask, RR 16, T98.6, /80, HR 86   Physical Exam:   General: NAD, alert & oriented x 3   HEENT: AT/NC   CV: S1, S2, no murmurs appreciated   Resp: B/L diminished breath sounds, B/L lower minimal rales, no wheezing   Abd: soft, NT, ND, +BS   Ext: no b/l calf tenderness, no b/l pedal edema    Labs:                         10.7   8.3   )-----------( 207      ( 29 Apr 2018 07:19 )             33.2   04-29    136  |  94<L>  |  53.0<H>  ----------------------------<  125<H>  3.8   |  27.0  |  1.78<H>    Ca    9.5      29 Apr 2018 07:19    TPro  5.8<L>  /  Alb  3.4  /  TBili  0.5  /  DBili  x   /  AST  118<H>  /  ALT  87<H>  /  AlkPhos  83  04-29 Rapid Response Note:     89 years old female with PMH of CHF, A. Fib on Coumadin, MV Repair, TIA and Breast Cancer brought by EMS with shortness of breath was admitted for sepsis secondary to multilobar PNA.  She was started on Vanco/Zosyn and then switched to Azithro/Rocephin.  Rapid was called because Ms. Emmanuel pulled off her venti mask and de-sated to 84% on room air.      Vitals: SpO2 89% on 50% venti mask, RR 16, T98.6, /80, HR 86   Physical Exam:   General: NAD, alert & oriented x 3   HEENT: AT/NC   CV: S1, S2, no murmurs appreciated   Resp: B/L diminished breath sounds, B/L lower minimal rales, no wheezing   Abd: soft, NT, ND, +BS   Ext: no b/l calf tenderness, no b/l pedal edema    Labs:                         10.7   8.3   )-----------( 207      ( 29 Apr 2018 07:19 )             33.2   04-29    136  |  94<L>  |  53.0<H>  ----------------------------<  125<H>  3.8   |  27.0  |  1.78<H>    Ca    9.5      29 Apr 2018 07:19    TPro  5.8<L>  /  Alb  3.4  /  TBili  0.5  /  DBili  x   /  AST  118<H>  /  ALT  87<H>  /  AlkPhos  83  04-29    A/P: 89 years old female with PMH of CHF, A. Fib on Coumadin, MV Repair, TIA and Breast Cancer being treated for PNA with a rapid response called for hypoxia secondary to venti mask being pulled off.   Pt placed back on venti mask, O2 sat improved to 92%.  Ms. Emmanuel has been sating at 92%.  TTE pending.   Haldol 0.5mg IM given for agitation.   E-ICU consulted, stated that since mask was pulled off and O2 sat improved with mask that e-icu care is not needed.    Case discussed with Dr. Garcia.  Chest Xray ordered and lasix AM dose 40mg IV given.   Dr. Garcia in agreement Rapid Response Note:     89 years old female with PMH of CHF, A. Fib on Coumadin, MV Repair, TIA and Breast Cancer brought by EMS with shortness of breath was admitted for sepsis secondary to multilobar PNA.  She was started on Vanco/Zosyn and then switched to Azithro/Rocephin.  Rapid was called because Ms. Emmanuel pulled off her venti mask and de-sated to 84% on room air.      Vitals: SpO2 89% on 50% venti mask, RR 16, T98.6, /80, HR 86   Physical Exam:   General: NAD, alert & oriented x 3   HEENT: AT/NC   CV: S1, S2, no murmurs appreciated   Resp: B/L diminished breath sounds, B/L lower minimal rales, no wheezing   Abd: soft, NT, ND, +BS   Ext: no b/l calf tenderness, no b/l pedal edema    Labs:                         10.7   8.3   )-----------( 207      ( 29 Apr 2018 07:19 )             33.2   04-29    136  |  94<L>  |  53.0<H>  ----------------------------<  125<H>  3.8   |  27.0  |  1.78<H>    Ca    9.5      29 Apr 2018 07:19    TPro  5.8<L>  /  Alb  3.4  /  TBili  0.5  /  DBili  x   /  AST  118<H>  /  ALT  87<H>  /  AlkPhos  83  04-29    A/P: 89 years old female with PMH of CHF, A. Fib on Coumadin, MV Repair, TIA and Breast Cancer being treated for PNA with a rapid response called for hypoxia secondary to venti mask being pulled off.   Pt placed back on venti mask, O2 sat improved to 92%.  Ms. Emmanuel has been sating at 92%.  TTE pending.   Haldol 0.5mg IM given for agitation.   E-ICU consulted, stated that since mask was pulled off and O2 sat improved with mask that e-icu care is not needed.    Case discussed with Dr. Garcia.  Chest Xray ordered and lasix AM dose 40mg IV given.   Pts O2 sat improved to 94% on venti mask.   Her son, Alvin at bedside.  Updated on plan and pt was much calmer with son at bedside.   Dr. Garcia in agreement with plan.   Theron, PGY 3

## 2018-04-30 NOTE — PROGRESS NOTE ADULT - SUBJECTIVE AND OBJECTIVE BOX
[facility]                                   [address] [place] [address]                                                      PHONE: [phone]                                                         FAX: [phone]  [email or web address]  ---------------------------------------------------------------------------------------------------------------------------------    Overnight events/patient complaints: on VM O2, comfortable, clinically dry      No Known Allergies    MEDICATIONS  (STANDING):  ALBUTerol/ipratropium for Nebulization 3 milliLiter(s) Nebulizer every 6 hours  cholecalciferol 1000 Unit(s) Oral daily  digoxin     Tablet 0.125 milliGRAM(s) Oral daily  multivitamin 1 Tablet(s) Oral daily  piperacillin/tazobactam IVPB. 3.375 Gram(s) IV Intermittent every 8 hours  risperiDONE   Tablet 0.5 milliGRAM(s) Oral two times a day  saccharomyces boulardii 250 milliGRAM(s) Oral two times a day  vancomycin  IVPB 500 milliGRAM(s) IV Intermittent every 24 hours    MEDICATIONS  (PRN):  acetaminophen   Tablet 650 milliGRAM(s) Oral every 6 hours PRN For Temp greater than 38 C (100.4 F)  acetaminophen   Tablet. 650 milliGRAM(s) Oral every 6 hours PRN Headache or Bodyache  ALBUTerol    0.083% 2.5 milliGRAM(s) Nebulizer every 4 hours PRN Shortness of Breath and/or Wheezing  benzocaine 15 mG/menthol 3.6 mG Lozenge 1 Lozenge Oral every 3 hours PRN Sore Throat  guaiFENesin   Syrup  (Sugar-Free) 100 milliGRAM(s) Oral every 6 hours PRN Cough      Vital Signs Last 24 Hrs  T(C): 36.5 (30 Apr 2018 05:17), Max: 38.1 (29 Apr 2018 14:29)  T(F): 97.7 (30 Apr 2018 05:17), Max: 100.5 (29 Apr 2018 14:29)  HR: 72 (30 Apr 2018 05:17) (64 - 84)  BP: 96/54 (30 Apr 2018 05:17) (82/42 - 114/61)  BP(mean): --  RR: 20 (30 Apr 2018 05:17) (18 - 22)  SpO2: 94% (30 Apr 2018 05:17) (93% - 98%)  Daily     Daily   ICU Vital Signs Last 24 Hrs  ROCCO VIDAL  I&O's Detail    I&O's Summary    Drug Dosing Weight  ROCCO AZULI      PHYSICAL EXAM:  General: Appears well developed, well nourished alert and cooperative.  HEENT: Head; normocephalic, atraumatic.  Eyes: Pupils reactive, cornea wnl.  Neck: Supple, no nodes adenopathy,no JVD.  CARDIOVASCULAR: Normal S1 and S2, No murmur, rub, gallop or lift.   LUNGS: coarse rales/rhonchi bilat  ABDOMEN: Soft, nontender without mass or organomegaly. bowel sounds normoactive.  EXTREMITIES: No clubbing, cyanosis or edema. Distal pulses wnl.   SKIN: warm and dry with normal turgor.  NEURO: Alert/oriented x 3/normal motor exam. No pathologic reflexes.    PSYCH: normal affect.        LABS:                        10.7   8.3   )-----------( 207      ( 29 Apr 2018 07:19 )             33.2     04-29    136  |  94<L>  |  53.0<H>  ----------------------------<  125<H>  3.8   |  27.0  |  1.78<H>    Ca    9.5      29 Apr 2018 07:19    TPro  5.8<L>  /  Alb  3.4  /  TBili  0.5  /  DBili  x   /  AST  118<H>  /  ALT  87<H>  /  AlkPhos  83  04-29    ROCCO VIDAL      PT/INR - ( 29 Apr 2018 15:58 )   PT: 83.8 sec;   INR: 7.31 ratio         PTT - ( 29 Apr 2018 15:58 )  PTT:40.9 sec      RADIOLOGY & ADDITIONAL STUDIES:    INTERPRETATION OF TELEMETRY (personally reviewed):    ECG:< from: 12 Lead ECG (04.28.18 @ 10:06) >  Diagnosis Line   Accelerated Junctional rhythm with frequent ventricular-paced complexes  Septal infarct , age undetermined  Lateral infarct , age undetermined  ST & T wave abnormality, consider inferior ischemia  ST & T wave abnormality, consider anterior ischemia      Confirmed by LEONARDO MURO ([id number]) on 4/28/2018 6:10:22 PM    < end of copied text >      ECHO:  In office, normal EF, s/p MV repair with mod to severe MR

## 2018-04-30 NOTE — PROGRESS NOTE ADULT - SUBJECTIVE AND OBJECTIVE BOX
CHIEF COMPLAINT/INTERVAL HISTORY:  Pt. seen and evaluated for multilobar pneumonia.  Pt. was agitated earlier today and removing her O2 with subsequent decline in SpO2.   Pt. is more responsive today per family.  Tolerating IV antibiotics.  Pt. is reporting no SOB or CP.  Pt. denies having BRBPR or melena with elevated INR.      REVIEW OF SYSTEMS:  + low grade fever yesterday.  Today afebrile.  No CP or abdominal pain.     Vital Signs Last 24 Hrs  T(C): 36.5 (30 Apr 2018 05:17), Max: 38.1 (29 Apr 2018 14:29)  T(F): 97.7 (30 Apr 2018 05:17), Max: 100.5 (29 Apr 2018 14:29)  HR: 72 (30 Apr 2018 05:17) (64 - 84)  BP: 96/54 (30 Apr 2018 05:17) (82/42 - 114/61)  BP(mean): --  RR: 20 (30 Apr 2018 05:17) (18 - 22)  SpO2: 94% (30 Apr 2018 05:17) (93% - 98%)    PHYSICAL EXAM:  GENERAL: NAD  HEENT: EOMI, hearing normal, conjunctiva and sclera clear  Chest: Coarse BS bilaterally, no wheezing  CV: S1S2, RRR,   GI: soft, +BS, NT/ND  Musculoskeletal: no edema  Psychiatric: affect nL, mood nL  Skin: warm and dry    LABS:                        10.7   8.3   )-----------( 207      ( 29 Apr 2018 07:19 )             33.2     04-29    136  |  94<L>  |  53.0<H>  ----------------------------<  125<H>  3.8   |  27.0  |  1.78<H>    Ca    9.5      29 Apr 2018 07:19    TPro  5.8<L>  /  Alb  3.4  /  TBili  0.5  /  DBili  x   /  AST  118<H>  /  ALT  87<H>  /  AlkPhos  83  04-29    PT/INR - ( 29 Apr 2018 15:58 )   PT: 83.8 sec;   INR: 7.31 ratio         PTT - ( 29 Apr 2018 15:58 )  PTT:40.9 sec      Assessment and Plan:  -Sepsis 2/2 multilobar pneumonia:  Continue IV Zosyn and Vancomycin.  NS@75cc/hr.  Duoneb tx Q6h.  ID f/u  -Chronic Systolic CHF:  no evidence of acute chf.  Off diuretics for now.  Cardiology f/u  -Afib:  Digoxin 0.125mg PO daily and Metoprolol 2.5mg IV Q6h PRN for HR>120.  Holding coumadin given supra-therapeutic INR  -Supra-therapeutic INR:  No gross bleeding.  Holding coumadin.  Monitor INR  -VERONIKA:  off diuretics.  started on NS hydration.  Nephrology consult.   -Anemia:  Hbg stable.  Will continue to monitor.    -VTE ppx:  Supra-therapeutic INR.  - CHIEF COMPLAINT/INTERVAL HISTORY:  Pt. seen and evaluated for multilobar pneumonia.  Pt. was agitated earlier today and removing her O2 with subsequent decline in SpO2.   Pt. is more responsive today per family.  Tolerating IV antibiotics.  Pt. is reporting no SOB or CP.  Pt. denies having BRBPR or melena with elevated INR.      REVIEW OF SYSTEMS:  + low grade fever yesterday.  Today afebrile.  No CP or abdominal pain.     Vital Signs Last 24 Hrs  T(C): 36.5 (30 Apr 2018 05:17), Max: 38.1 (29 Apr 2018 14:29)  T(F): 97.7 (30 Apr 2018 05:17), Max: 100.5 (29 Apr 2018 14:29)  HR: 72 (30 Apr 2018 05:17) (64 - 84)  BP: 96/54 (30 Apr 2018 05:17) (82/42 - 114/61)  BP(mean): --  RR: 20 (30 Apr 2018 05:17) (18 - 22)  SpO2: 94% (30 Apr 2018 05:17) (93% - 98%)    PHYSICAL EXAM:  GENERAL: NAD  HEENT: EOMI, hearing normal, conjunctiva and sclera clear  Chest: Coarse BS bilaterally, no wheezing  CV: S1S2, RRR,   GI: soft, +BS, NT/ND  Musculoskeletal: no edema  Psychiatric: affect nL, mood nL  Skin: warm and dry    LABS:                        10.7   8.3   )-----------( 207      ( 29 Apr 2018 07:19 )             33.2     04-29    136  |  94<L>  |  53.0<H>  ----------------------------<  125<H>  3.8   |  27.0  |  1.78<H>    Ca    9.5      29 Apr 2018 07:19    TPro  5.8<L>  /  Alb  3.4  /  TBili  0.5  /  DBili  x   /  AST  118<H>  /  ALT  87<H>  /  AlkPhos  83  04-29    PT/INR - ( 29 Apr 2018 15:58 )   PT: 83.8 sec;   INR: 7.31 ratio         PTT - ( 29 Apr 2018 15:58 )  PTT:40.9 sec      Assessment and Plan:  -Sepsis 2/2 multilobar pneumonia:  Continue IV Zosyn and Vancomycin.  NS@75cc/hr.  Duoneb tx Q6h.  ID f/u  -Chronic Systolic CHF:  no evidence of acute chf.  Off diuretics for now.  Cardiology f/u  -Afib:  Digoxin 0.125mg PO daily and Metoprolol 2.5mg IV Q6h PRN for HR>120.  Holding coumadin given supra-therapeutic INR  -Supra-therapeutic INR:  No gross bleeding.  Holding coumadin.  Monitor INR  -VERONIKA:  off diuretics.  started on NS hydration.  Nephrology consult.   -Anemia:  Hbg stable.  Will continue to monitor.    -VTE ppx:  Supra-therapeutic INR.  -Family considering comfort care if no improvement in next couple of days.

## 2018-04-30 NOTE — CONSULT NOTE ADULT - SUBJECTIVE AND OBJECTIVE BOX
[facility] DIVISION OF KIDNEY DISEASES AND HYPERTENSION -- INITIAL CONSULT NOTE  --------------------------------------------------------------------------------  HPI:  89 year old F with CHF, AFib on AC, MV repair, TIA, breast ca brought in for SOB; found to have multilobar PNA. Pt had rapid response overnight with desaturation. Has been hypotensive for past 24 hours. Son at bedside; wishes for her to be comfortable; no aggressive measures. Called for VERONIKA.    PAST HISTORY  --------------------------------------------------------------------------------  PAST MEDICAL & SURGICAL HISTORY:  TIA (transient ischemic attack)  MVP (mitral valve prolapse)  Breast CA, right: lumpectomy  Pacemaker  Afib  H/O hernia repair  S/P mastectomy, right  S/P cataract surgery, right  S/P hysterectomy  S/P cholecystectomy: with AP  S/P mitral valve repair: PPM    FAMILY HISTORY:  Family history of breast cancer in sister (Sibling)  Family history of stroke (Sibling, Sibling)    PAST SOCIAL HISTORY:    ALLERGIES & MEDICATIONS  --------------------------------------------------------------------------------  Allergies    No Known Allergies    Intolerances      Standing Inpatient Medications  ALBUTerol/ipratropium for Nebulization 3 milliLiter(s) Nebulizer every 6 hours  cholecalciferol 1000 Unit(s) Oral daily  digoxin     Tablet 0.125 milliGRAM(s) Oral daily  multivitamin 1 Tablet(s) Oral daily  piperacillin/tazobactam IVPB. 3.375 Gram(s) IV Intermittent every 8 hours  risperiDONE   Tablet 0.5 milliGRAM(s) Oral two times a day  saccharomyces boulardii 250 milliGRAM(s) Oral two times a day  sodium chloride 0.9%. 1000 milliLiter(s) IV Continuous <Continuous>  vancomycin  IVPB 500 milliGRAM(s) IV Intermittent every 24 hours    PRN Inpatient Medications  acetaminophen   Tablet 650 milliGRAM(s) Oral every 6 hours PRN  acetaminophen   Tablet. 650 milliGRAM(s) Oral every 6 hours PRN  ALBUTerol    0.083% 2.5 milliGRAM(s) Nebulizer every 4 hours PRN  benzocaine 15 mG/menthol 3.6 mG Lozenge 1 Lozenge Oral every 3 hours PRN  guaiFENesin   Syrup  (Sugar-Free) 100 milliGRAM(s) Oral every 6 hours PRN  haloperidol    Injectable 0.5 milliGRAM(s) IV Push every 6 hours PRN  metoprolol tartrate Injectable 2.5 milliGRAM(s) IV Push every 6 hours PRN      REVIEW OF SYSTEMS  --------------------------------------------------------------------------------  Unable to obtain    VITALS/PHYSICAL EXAM  --------------------------------------------------------------------------------  T(C): 36.5 (04-30-18 @ 05:17), Max: 38.1 (04-29-18 @ 14:29)  HR: 83 (04-30-18 @ 09:30) (64 - 85)  BP: 96/54 (04-30-18 @ 05:17) (82/42 - 114/61)  RR: 20 (04-30-18 @ 05:17) (18 - 22)  SpO2: 92% (04-30-18 @ 09:30) (92% - 98%)  Wt(kg): --        Physical Exam:  	Gen: NAD, ill appearing ; pale  	HEENT: PERRL, supple neck, clear oropharynx  	Pulm: rhonchi BL  	CV: RRR, S1S2; no rub  	Back: No spinal or CVA tenderness; no sacral edema  	Abd: +BS, soft, nontender/nondistended  	: No suprapubic tenderness  	UE: Warm, FROM, no clubbing, intact strength; no edema; no asterixis  	LE: Warm, FROM, no clubbing, intact strength; no edema  	Neuro: No focal deficits, intact gait  	Psych: Normal affect and mood  	Skin: Warm, without rashes  	Vascular access:    LABS/STUDIES  --------------------------------------------------------------------------------              10.7   8.3   >-----------<  207      [04-29-18 @ 07:19]              33.2     136  |  94  |  53.0  ----------------------------<  125      [04-29-18 @ 07:19]  3.8   |  27.0  |  1.78        Ca     9.5     [04-29-18 @ 07:19]    TPro  5.8  /  Alb  3.4  /  TBili  0.5  /  DBili  x   /  AST  118  /  ALT  87  /  AlkPhos  83  [04-29-18 @ 07:19]    PT/INR: PT 83.8 , INR 7.31       [04-29-18 @ 15:58]  PTT: 40.9       [04-29-18 @ 15:58]      Creatinine Trend:  SCr 1.78 [04-29 @ 07:19]  SCr 1.68 [04-28 @ 07:28]  SCr 1.15 [04-26 @ 07:44]  SCr 1.19 [04-26 @ 00:33]    Urinalysis - [04-26-18 @ 03:24]      Color Yellow / Appearance Clear / SG 1.015 / pH 5.0      Gluc Negative / Ketone Trace  / Bili Negative / Urobili Negative       Blood Small / Protein 30 / Leuk Est Negative / Nitrite Negative      RBC 0-2 / WBC Negative / Hyaline  / Gran  / Sq Epi  / Non Sq Epi Occasional / Bacteria       Lipid: chol 141, , HDL 41, LDL 67      [04-29-18 @ 07:19]

## 2018-04-30 NOTE — PROGRESS NOTE ADULT - ASSESSMENT
Assessment  Multilobar PNA  No clinical nor radiographic evidence of CHF  renal insuff  s/p MV rpeair w residual mod to sevre MR, normal EF  chronic AF with VVI pacing  elevated INR due to AB      Rerc  Cont IV AB  hold further diuresis  IVF hydration  dig level low dose lopressor for HR  spoke at length with family, pt is DNR DNR and comfort id primary goal in addition to Rx of PNA Assessment  Multilobar PNA  No clinical nor radiographic evidence of CHF  renal insuff  s/p MV rpeair w residual mod to sevre MR, normal EF  chronic AF with VVI pacing  elevated INR due to AB      Rerc  Cont IV AB  hold further diuresis  IVF hydration  dig level low dose lopressor for HR  INR 2-3  spoke at length with family, pt is DNR DNR and comfort id primary goal in addition to Rx of PNA

## 2018-05-01 VITALS
DIASTOLIC BLOOD PRESSURE: 56 MMHG | HEART RATE: 56 BPM | TEMPERATURE: 98 F | RESPIRATION RATE: 18 BRPM | OXYGEN SATURATION: 97 % | SYSTOLIC BLOOD PRESSURE: 109 MMHG

## 2018-05-01 DIAGNOSIS — N17.9 ACUTE KIDNEY FAILURE, UNSPECIFIED: ICD-10-CM

## 2018-05-01 DIAGNOSIS — I48.91 UNSPECIFIED ATRIAL FIBRILLATION: ICD-10-CM

## 2018-05-01 DIAGNOSIS — I50.9 HEART FAILURE, UNSPECIFIED: ICD-10-CM

## 2018-05-01 LAB
ANION GAP SERPL CALC-SCNC: 16 MMOL/L — SIGNIFICANT CHANGE UP (ref 5–17)
APTT BLD: 27.7 SEC — SIGNIFICANT CHANGE UP (ref 27.5–37.4)
BUN SERPL-MCNC: 63 MG/DL — HIGH (ref 8–20)
CALCIUM SERPL-MCNC: 9.2 MG/DL — SIGNIFICANT CHANGE UP (ref 8.6–10.2)
CHLORIDE SERPL-SCNC: 99 MMOL/L — SIGNIFICANT CHANGE UP (ref 98–107)
CO2 SERPL-SCNC: 26 MMOL/L — SIGNIFICANT CHANGE UP (ref 22–29)
CREAT SERPL-MCNC: 2.24 MG/DL — HIGH (ref 0.5–1.3)
DIGOXIN SERPL-MCNC: 2.5 NG/ML — HIGH (ref 0.8–2)
GLUCOSE SERPL-MCNC: 138 MG/DL — HIGH (ref 70–115)
HCT VFR BLD CALC: 32.4 % — LOW (ref 37–47)
HGB BLD-MCNC: 9.9 G/DL — LOW (ref 12–16)
INR BLD: 1.13 RATIO — SIGNIFICANT CHANGE UP (ref 0.88–1.16)
MAGNESIUM SERPL-MCNC: 2.5 MG/DL — SIGNIFICANT CHANGE UP (ref 1.8–2.6)
MCHC RBC-ENTMCNC: 30.6 G/DL — LOW (ref 32–36)
MCHC RBC-ENTMCNC: 30.7 PG — SIGNIFICANT CHANGE UP (ref 27–31)
MCV RBC AUTO: 100.6 FL — HIGH (ref 81–99)
PLATELET # BLD AUTO: 195 K/UL — SIGNIFICANT CHANGE UP (ref 150–400)
POTASSIUM SERPL-MCNC: 4.3 MMOL/L — SIGNIFICANT CHANGE UP (ref 3.5–5.3)
POTASSIUM SERPL-SCNC: 4.3 MMOL/L — SIGNIFICANT CHANGE UP (ref 3.5–5.3)
PROTHROM AB SERPL-ACNC: 12.5 SEC — SIGNIFICANT CHANGE UP (ref 9.8–12.7)
RBC # BLD: 3.22 M/UL — LOW (ref 4.4–5.2)
RBC # FLD: 14.2 % — SIGNIFICANT CHANGE UP (ref 11–15.6)
SODIUM SERPL-SCNC: 141 MMOL/L — SIGNIFICANT CHANGE UP (ref 135–145)
VANCOMYCIN TROUGH SERPL-MCNC: 10.1 UG/ML — SIGNIFICANT CHANGE UP (ref 10–20)
WBC # BLD: 8.4 K/UL — SIGNIFICANT CHANGE UP (ref 4.8–10.8)
WBC # FLD AUTO: 8.4 K/UL — SIGNIFICANT CHANGE UP (ref 4.8–10.8)

## 2018-05-01 PROCEDURE — 99222 1ST HOSP IP/OBS MODERATE 55: CPT

## 2018-05-01 PROCEDURE — 99239 HOSP IP/OBS DSCHRG MGMT >30: CPT

## 2018-05-01 PROCEDURE — 99233 SBSQ HOSP IP/OBS HIGH 50: CPT

## 2018-05-01 PROCEDURE — 99232 SBSQ HOSP IP/OBS MODERATE 35: CPT

## 2018-05-01 RX ORDER — PIPERACILLIN AND TAZOBACTAM 4; .5 G/20ML; G/20ML
3.38 INJECTION, POWDER, LYOPHILIZED, FOR SOLUTION INTRAVENOUS EVERY 12 HOURS
Qty: 0 | Refills: 0 | Status: DISCONTINUED | OUTPATIENT
Start: 2018-05-01 | End: 2018-05-01

## 2018-05-01 RX ORDER — ROBINUL 0.2 MG/ML
0.2 INJECTION INTRAMUSCULAR; INTRAVENOUS EVERY 4 HOURS
Qty: 0 | Refills: 0 | Status: DISCONTINUED | OUTPATIENT
Start: 2018-05-01 | End: 2018-05-01

## 2018-05-01 RX ORDER — HYDROMORPHONE HYDROCHLORIDE 2 MG/ML
0.5 INJECTION INTRAMUSCULAR; INTRAVENOUS; SUBCUTANEOUS ONCE
Qty: 0 | Refills: 0 | Status: DISCONTINUED | OUTPATIENT
Start: 2018-05-01 | End: 2018-05-01

## 2018-05-01 RX ORDER — SODIUM CHLORIDE 9 MG/ML
1000 INJECTION, SOLUTION INTRAVENOUS
Qty: 0 | Refills: 0 | Status: DISCONTINUED | OUTPATIENT
Start: 2018-05-01 | End: 2018-05-01

## 2018-05-01 RX ORDER — SODIUM CHLORIDE 9 MG/ML
1000 INJECTION INTRAMUSCULAR; INTRAVENOUS; SUBCUTANEOUS
Qty: 0 | Refills: 0 | Status: DISCONTINUED | OUTPATIENT
Start: 2018-05-01 | End: 2018-05-01

## 2018-05-01 RX ORDER — HYDROMORPHONE HYDROCHLORIDE 2 MG/ML
0.5 INJECTION INTRAMUSCULAR; INTRAVENOUS; SUBCUTANEOUS EVERY 6 HOURS
Qty: 0 | Refills: 0 | Status: DISCONTINUED | OUTPATIENT
Start: 2018-05-01 | End: 2018-05-01

## 2018-05-01 RX ADMIN — Medication 3 MILLILITER(S): at 03:07

## 2018-05-01 RX ADMIN — PIPERACILLIN AND TAZOBACTAM 25 GRAM(S): 4; .5 INJECTION, POWDER, LYOPHILIZED, FOR SOLUTION INTRAVENOUS at 05:50

## 2018-05-01 RX ADMIN — Medication 3 MILLILITER(S): at 08:51

## 2018-05-01 RX ADMIN — Medication 3 MILLILITER(S): at 14:06

## 2018-05-01 RX ADMIN — HYDROMORPHONE HYDROCHLORIDE 0.5 MILLIGRAM(S): 2 INJECTION INTRAMUSCULAR; INTRAVENOUS; SUBCUTANEOUS at 12:30

## 2018-05-01 RX ADMIN — HYDROMORPHONE HYDROCHLORIDE 0.5 MILLIGRAM(S): 2 INJECTION INTRAMUSCULAR; INTRAVENOUS; SUBCUTANEOUS at 12:14

## 2018-05-01 RX ADMIN — SODIUM CHLORIDE 60 MILLILITER(S): 9 INJECTION, SOLUTION INTRAVENOUS at 12:17

## 2018-05-01 RX ADMIN — PIPERACILLIN AND TAZOBACTAM 25 GRAM(S): 4; .5 INJECTION, POWDER, LYOPHILIZED, FOR SOLUTION INTRAVENOUS at 00:40

## 2018-05-01 NOTE — PROGRESS NOTE ADULT - SUBJECTIVE AND OBJECTIVE BOX
[facility]  INFECTIOUS DISEASES AND INTERNAL MEDICINE at [place]  =======================================================  Jos Frye MD  Diplomates American Board of Internal Medicine and Infectious Diseases  =======================================================    [place] [id number]    Follow up:    Allergies:  No Known Allergies      Medications:  acetaminophen   Tablet 650 milliGRAM(s) Oral every 6 hours PRN  ALBUTerol    0.083% 2.5 milliGRAM(s) Nebulizer every 4 hours PRN  ALBUTerol/ipratropium for Nebulization 3 milliLiter(s) Nebulizer every 6 hours  glycopyrrolate Injectable 0.2 milliGRAM(s) IV Push every 4 hours PRN  haloperidol    Injectable 0.5 milliGRAM(s) IV Push every 6 hours PRN  HYDROmorphone  Injectable 0.5 milliGRAM(s) IV Push every 6 hours  HYDROmorphone  Injectable 0.5 milliGRAM(s) IV Push once  LORazepam   Injectable 0.5 milliGRAM(s) IV Push every 4 hours PRN  metoprolol tartrate Injectable 2.5 milliGRAM(s) IV Push every 6 hours PRN  piperacillin/tazobactam IVPB. 3.375 Gram(s) IV Intermittent every 12 hours  sodium chloride 0.9%. 1000 milliLiter(s) IV Continuous <Continuous>            REVIEW OF SYSTEMS:  CONSTITUTIONAL:  No Fever or chills  HEENT:   No diplopia or blurred vision.  No earache, sore throat or runny nose.  CARDIOVASCULAR:  No pressure, squeezing, strangling, tightness, heaviness or aching about the chest, neck, axilla or epigastrium.  RESPIRATORY:  No cough, shortness of breath, PND or orthopnea.  GASTROINTESTINAL:  No nausea, vomiting or diarrhea.  GENITOURINARY:  No dysuria, frequency or urgency. No Blood in urine  MUSCULOSKELETAL:  no joint aches, no muscle pain  SKIN:  No change in skin, hair or nails.  NEUROLOGIC:  No paresthesias, fasciculations, seizures or weakness.  PSYCHIATRIC:  No disorder of thought or mood.  ENDOCRINE:  No heat or cold intolerance, polyuria or polydipsia.  HEMATOLOGICAL:  No easy bruising or bleeding.            Physical Exam:  ICU Vital Signs Last 24 Hrs  T(C): 36.8 (01 May 2018 08:10), Max: 36.9 (30 Apr 2018 22:32)  T(F): 98.2 (01 May 2018 08:10), Max: 98.4 (30 Apr 2018 22:32)  HR: 56 (01 May 2018 08:10) (56 - 87)  BP: 109/56 (01 May 2018 08:10) (101/59 - 109/60)  BP(mean): --  ABP: --  ABP(mean): --  RR: 18 (01 May 2018 08:10) (18 - 20)  SpO2: 97% (01 May 2018 08:10) (86% - 97%)    GEN: NAD, pleasant  HEENT: normocephalic and atraumatic. EOMI. NAVJOT.    NECK: Supple. No carotid bruits.  No lymphadenopathy or thyromegaly.  LUNGS: Clear to auscultation.  HEART: Regular rate and rhythm without murmur.  ABDOMEN: Soft, nontender, and nondistended.  Positive bowel sounds.    : No CVA tenderness  EXTREMITIES: Without any cyanosis, clubbing, rash, lesions or edema.  MSK: no joint swelling  NEUROLOGIC: Cranial nerves II through XII are grossly intact.  PSYCHIATRIC: Appropriate affect .  SKIN: No ulceration or induration present.        Labs:  05-01    141  |  99  |  63.0<H>  ----------------------------<  138<H>  4.3   |  26.0  |  2.24<H>    Ca    9.2      01 May 2018 07:51  Mg     2.5     05-01                       9.9    8.4   )-----------( 195      ( 01 May 2018 07:51 )             32.4     PT/INR - ( 01 May 2018 07:51 )   PT: 12.5 sec;   INR: 1.13 ratio    PTT - ( 01 May 2018 07:51 )  PTT:27.7 sec    RECENT CULTURES:  04-26 @ 15:18 .Blood Blood     No growth at 48 hours    04-26 @ 03:26 .Urine Clean Catch (Midstream)     No growth    Detected  04-26 @ 00:35 .Blood Blood     No growth at 48 hours    04-26 @ 00:34 .Blood Blood     No growth at 48 hours    CXR:   Findings:  Persistent diffuse airspace disease in the right lung without significant   change since prior study. Less pronounced but persistent airspace disease   in the left upper lobe, unchanged. Obscuration of the left hemidiaphragm,   likely a left pleural effusion. Cardiomegaly..    Impression:  Stable exam without significant change since the previous study.. [facility]  INFECTIOUS DISEASES AND INTERNAL MEDICINE at [place]  =======================================================  Jos Chaparro MD FACP   Jorden Frye MD  Diplomates American Board of Internal Medicine and Infectious Diseases  =======================================================    ROCCO VIDAL [id number]    Follow up:  89 y/o woman with PMH of CHF, A. Fib on Coumadin, MV Repair, TIA and Breast Cancer was admitted on 4/26 with multilobar pneumonia. His viral panel was positive for metapneumovirus. Started on Cefuroxime by PMD with worsening of her condition so she was brought to [facility].    As per his son who is at bedside, she was completely alert and oriented on 25th when they went out for lunch. After that she started getting confused and disoriented.   She was started on ceftriaxone and azithromycin with worsening of consolidation in lungs and no improvement in her condition.   Today she is no awake barely arousal not combative anymore. On NC with some respiratory distress       Past Medical & Surgical Hx:  PAST MEDICAL & SURGICAL HISTORY:  TIA (transient ischemic attack)  MVP (mitral valve prolapse)  Breast CA, right: lumpectomy  Pacemaker  Afib  H/O hernia repair  S/P mastectomy, right  S/P cataract surgery, right  S/P hysterectomy  S/P cholecystectomy: with AP  S/P mitral valve repair: PPM    Social Hx: unkonwn    FAMILY HISTORY:  Family history of breast cancer in sister (Sibling)  Family history of stroke (Sibling, Sibling)      Allergies  No Known Allergies    ANTIBIOTICS:   stopped ceftriaxone+azithromycin and started zosyn and vancomycin      REVIEW OF SYSTEMS:  unable to obtain     Physical Exam:  ICU Vital Signs Last 24 Hrs  T(C): 36.8 (01 May 2018 08:10), Max: 36.9 (30 Apr 2018 22:32)  T(F): 98.2 (01 May 2018 08:10), Max: 98.4 (30 Apr 2018 22:32)  HR: 56 (01 May 2018 08:10) (56 - 87)  BP: 109/56 (01 May 2018 08:10) (101/59 - 109/60)  RR: 18 (01 May 2018 08:10) (18 - 20)  SpO2: 97% (01 May 2018 08:10) (86% - 97%)  GEN: not alert or oriented but awake, with o2 mask  HEENT: normocephalic and atraumatic.     NECK: Supple.  LUNGS: ronchi and rales bilaterally scattered in both lungs  HEART: irregular rate and rhythm 3/6 systolic murmur, pace maker in place left side of chest nontender  ABDOMEN: Soft, nontender, and nondistended.  Positive bowel sounds.    EXTREMITIES: Without any cyanosis, clubbing, rash, lesions or edema.  MSK: No joint swelling  NEUROLOGIC: unable to obtain  SKIN: No ulceration or induration present.      Labs:  05-01    141  |  99  |  63.0<H>  ----------------------------<  138<H>  4.3   |  26.0  |  2.24<H>    Ca    9.2      01 May 2018 07:51  Mg     2.5     05-01                       9.9    8.4   )-----------( 195      ( 01 May 2018 07:51 )             32.4     PT/INR - ( 01 May 2018 07:51 )   PT: 12.5 sec;   INR: 1.13 ratio    PTT - ( 01 May 2018 07:51 )  PTT:27.7 sec    RECENT CULTURES:  04-26 @ 15:18 .Blood Blood     No growth at 48 hours    04-26 @ 03:26 .Urine Clean Catch (Midstream)     No growth    Detected  04-26 @ 00:35 .Blood Blood     No growth at 48 hours    04-26 @ 00:34 .Blood Blood     No growth at 48 hours    CXR:   Findings:  Persistent diffuse airspace disease in the right lung without significant   change since prior study. Less pronounced but persistent airspace disease   in the left upper lobe, unchanged. Obscuration of the left hemidiaphragm,   likely a left pleural effusion. Cardiomegaly..    Impression:  Stable exam without significant change since the previous study..

## 2018-05-01 NOTE — CONSULT NOTE ADULT - ASSESSMENT
1) VERONIKA ; hypoperfusive  2) PNA  3) Anemia   4) Supratherapeutic INR    Elevated SCr in the setting of hypotension; ?sepsis  Check UA  Check Urine sodium , urine chloride, urine osm  Check renal/bladder sonogram  Family wishing for no aggressive measures  Consider holding AC overnight  Palliative consult  Will follow
89 year old woman with mitral valve prolapse status post repair 2001 with residual moderate to severe MR, normal coronary arteries, chronic atrial fibrillation on chronic anticoagulation with backup VVIR pacing, and breast cancer s/p lumpectomy who presents with progressive shortness of breath and productive cough with failure to improve with outpatient antibiotic therapy found to have sepsis secondary to human metapneumo viral infection and multifocal PNA as well as VERONIKA. Course is now complicated by delirium and worsening shortness of breath. CXR with worsening consolidations     Shortness of breath  - multifactorial including multifocal PNA and acute decompensated HFpEF (NYHA class III, AHA stage C) in the background of moderate to severe MR  - continue antibiotics per primary team  - start lasix 40mg IV Q12  - repeat TTE to assess for interval changes in valvular heart disease and reassessment of LVEF   - repeat BNP  - strict I/O and daily weights    Atrial fibrillation   [id number]- Vasc 7  - INR supratherapeutic; hold coumadin   - rate is controlled  - continue to hold atenolol given VERONIKA and borderline BP  - telemetry monitoring
89 yo lady with CHF hx, HMPV infection and hx of breast cancer  Now is quite unresponsive but she does not appear to be dyspneic or uncomfortable    Known DNR.      Continue rx for pneumonitis , renal insuff but prognosis appears quite poor.    Nothing else to add at this time.
89y/o woman with multilobar pneumonia (post viral ?) positive for metapneumovirus worsening on CAP coverage.   Will need broader coverage at his point.
78 yo Female at end of life in respiratory Failure    Multifocal Pneumonia and Sepsis- Continue antibiotics -family hoping for best preparing for the worst    AMS- no longer able to communicate- Airway clear, secretions manageable   Robinul 0.2mg  IV Q4-6 prn    Tachypnea-Labored breathing  Dilaudid 0.5mg IV Stat then Q6h ATC, Q4h prn    Restlessness and Agitation  Seems to have passed  Ativan 0.5mg IV Q4prn as needed    Hospice Appropriate- [facility],   to meet with Hospice Team later today or tomorrow    Family Overwhelmed-coming to terms with Loss of their Matriarch  Provide reassurance and support to family members  Frequently visiting throughout the day

## 2018-05-01 NOTE — PROGRESS NOTE ADULT - PROBLEM SELECTOR PLAN 1
-Possibly partially related to CHF  -Covering bacterial infection with zosyn and vancomycin   -Will stop vanco due to VERONIKA and send tough prior to next dose, will need daily trough and redosing when trough level <15. Unable to send sputum for culture so unclear if has MRSA or not. MRSA can happen after flu or other viral pneumonia.   -hMPV unlikely causing all her symptoms, treatment is supportive anyway.   -CT of chest when she is stable.  -Nutrition is important for her improvement, possibly needs NGT.   -Needs meds for delirium. sometimes combative.

## 2018-05-01 NOTE — CONSULT NOTE ADULT - SUBJECTIVE AND OBJECTIVE BOX
PULMONARY CONSULT NOTE      [id number]    Patient is a 89y old  Female who presents with a chief complaint of Shortness of breath (26 Apr 2018 02:39)    ROCCO VIDAL is a 89 year old woman with mitral valve prolapse status post repair 2001 with residual moderate to severe MR, normal coronary arteries, chronic atrial fibrillation on chronic anticoagulation with backup VVIR pacing, and breast cancer s/p lumpectomy who presents with progressive shortness of breath and productive cough with failure to improve with outpatient antibiotic therapy found to have sepsis secondary to human metapneumo viral infection and multifocal PNA as well as VERONIKA. Course is now complicated by delirium and persistent respiratory failure     Patient found to have HMPV infection, bilateral infiltrates,  Treated for bacterial pneumonia with piptazo and vanco    Had agitation last night, but now quite somnolent  Palliative care was notified  DNR status    INTERVAL HPI/OVERNIGHT EVENTS:    MEDICATIONS  (STANDING):  ALBUTerol/ipratropium for Nebulization 3 milliLiter(s) Nebulizer every 6 hours  dextrose 5% + sodium chloride 0.45%. 1000 milliLiter(s) (60 mL/Hr) IV Continuous <Continuous>  HYDROmorphone  Injectable 0.5 milliGRAM(s) IV Push every 6 hours  piperacillin/tazobactam IVPB. 3.375 Gram(s) IV Intermittent every 12 hours      MEDICATIONS  (PRN):  acetaminophen   Tablet 650 milliGRAM(s) Oral every 6 hours PRN For Temp greater than 38 C (100.4 F)  ALBUTerol    0.083% 2.5 milliGRAM(s) Nebulizer every 4 hours PRN Shortness of Breath and/or Wheezing  glycopyrrolate Injectable 0.2 milliGRAM(s) IV Push every 4 hours PRN secretion management  haloperidol    Injectable 0.5 milliGRAM(s) IV Push every 6 hours PRN agitation  LORazepam   Injectable 0.5 milliGRAM(s) IV Push every 4 hours PRN agitation restlessness moaning  metoprolol tartrate Injectable 2.5 milliGRAM(s) IV Push every 6 hours PRN HR > 120      Allergies    No Known Allergies    Intolerances        PAST MEDICAL & SURGICAL HISTORY:  TIA (transient ischemic attack)  MVP (mitral valve prolapse)  Breast CA, right: lumpectomy  Pacemaker  Afib  H/O hernia repair  S/P mastectomy, right  S/P cataract surgery, right  S/P hysterectomy  S/P cholecystectomy: with AP  S/P mitral valve repair: PPM      FAMILY HISTORY:  Family history of breast cancer in sister (Sibling)  Family history of stroke (Sibling, Sibling)      SOCIAL HISTORY  Smoking History:     REVIEW OF SYSTEMS:    CONSTITUTIONAL:  As per HPI.    HEENT:  Eyes:  No diplopia or blurred vision. ENT:  No earache, sore throat or runny nose.    CARDIOVASCULAR:  No pressure, squeezing, tightness, heaviness or aching about the chest; no palpitations.    RESPIRATORY:  No cough, shortness of breath, PND or orthopnea. Mild SOBOE    GASTROINTESTINAL:  No nausea, vomiting or diarrhea.    GENITOURINARY:  No dysuria, frequency or urgency.    MUSCULOSKELETAL:  No joint pains    SKIN:  No new lesions.    NEUROLOGIC:  No paresthesias, fasciculations, seizures or weakness.    PSYCHIATRIC:  No disorder of thought or mood.    ENDOCRINE:  No heat or cold intolerance, polyuria or polydipsia.    HEMATOLOGICAL:  No easy bruising or bleeding.     Vital Signs Last 24 Hrs  T(C): 36.8 (01 May 2018 08:10), Max: 36.9 (30 Apr 2018 22:32)  T(F): 98.2 (01 May 2018 08:10), Max: 98.4 (30 Apr 2018 22:32)  HR: 56 (01 May 2018 08:10) (56 - 87)  BP: 109/56 (01 May 2018 08:10) (101/59 - 109/60)  BP(mean): --  RR: 18 (01 May 2018 08:10) (18 - 20)  SpO2: 97% (01 May 2018 08:10) (86% - 97%)    PHYSICAL EXAMINATION:    GENERAL: The patient is a well-developed, well-nourished _lady who is not responsive verbally and does not respond to pain___      HEENT: Head is normocephalic and atraumatic. Extraocular muscles are intact. Mucous membranes are moist.     NECK: Supple.     LUNGS: Clear to auscultation without wheezing, rales, or rhonchi. Respirations unlabored    HEART: Regular rate and rhythm without murmur.    ABDOMEN: Soft, nontender, and nondistended.  No hepatosplenomegaly is noted.    EXTREMITIES: Without any cyanosis, clubbing, rash, lesions or edema.    NEUROLOGIC: Grossly intact.    SKIN: No ulceration or induration present.      LABS:                        9.9    8.4   )-----------( 195      ( 01 May 2018 07:51 )             32.4     05-01    141  |  99  |  63.0<H>  ----------------------------<  138<H>  4.3   |  26.0  |  2.24<H>    Ca    9.2      01 May 2018 07:51  Mg     2.5     05-01      PT/INR - ( 01 May 2018 07:51 )   PT: 12.5 sec;   INR: 1.13 ratio         PTT - ( 01 May 2018 07:51 )  PTT:27.7 sec                    MICROBIOLOGY:    RADIOLOGY & ADDITIONAL STUDIES:< from: Xray Chest 1 View- PORTABLE-Urgent (04.28.18 @ 10:59) >    INTERPRETATION:  CLINICAL INFORMATION: Shortness of breath.    TECHNIQUE: Frontal view of the chest   COMPARISON: April 26, 2018.    FINDINGS:    LUNGS/PLEURA: Worsening consolidation in both lungs. Small left pleural   effusion. No pneumothorax.  MEDIASTINUM: Cardiac silhouette is enlarged. Left chest wall single lead   cardiac pacemaker.  OTHER: Sternotomy.    IMPRESSION:     Small left pleural effusion with worsening consolidation in both lungs,   possibly infection.      < end of copied text >

## 2018-05-01 NOTE — DISCHARGE NOTE FOR THE EXPIRED PATIENT - SECONDARY DIAGNOSIS.
Pneumonia, bacterial Sepsis CHF (congestive heart failure) Afib VERONIKA (acute kidney injury) TIA (transient ischemic attack) Pacemaker

## 2018-05-01 NOTE — CONSULT NOTE ADULT - SUBJECTIVE AND OBJECTIVE BOX
HPI: This is a 89yo frail elderly female PMH of CHF and AFIB on anticoagulant TIA Breast Cancer; in RESPIRATORY FAILURE from Multifocal Pneumonia and Hypoxic.   AMS worsening overnight. Labored breathing RR>26min on venti mask higher concentration O2 needed- less agitated today as per son.  Unresponsive to verbal stimuli-not opening her eyes, moaning and grunting at times. Becomes hypoxic when she has pulled off O2 mask O2Sat in 70 %  Unable to do ROS due to mental status. Son at bedside we had a conversation about Goals of Care is already DNR/DNI    89 years old female with PMH of CHF, A. Fib on Coumadin, MV Repair, TIA and Breast Cancer brought by EMS with shortness of breath. As per patient, it started 2 days ago and is associated with productive cough. She went to see her PMD yesterday, who gave her Cefuroxime and Benzonatate She took 3 doses of antibiotics but her symptoms were worsening instead of improving so she came to the hospital. She is feeling weak and nauseous but denies vomiting, abdominal pain, headache, dizziness, fever, sick contacts or recent travel.  She was Code Sepsis in ER. (26 Apr 2018 02:39)      PERTINENT PMH REVIEWED: Yes     PAST MEDICAL & SURGICAL HISTORY:  TIA (transient ischemic attack)  MVP (mitral valve prolapse)  Breast CA, right: lumpectomy  Pacemaker  Afib  H/O hernia repair  S/P mastectomy, right  S/P cataract surgery, right  S/P hysterectomy  S/P cholecystectomy: with AP  S/P mitral valve repair: PPM      SOCIAL HISTORY:  EtOH   No                                    Drugs    No                                      nonsmoker                                    Admitted from: home Son: Alvin Emmanuel [phone]    FAMILY HISTORY:  Family history of breast cancer in sister (Sibling)  Family history of stroke (Sibling, Sibling)    No Known Allergies    Baseline ADLs (prior to admission):  Independent/  Present Symptoms:     Dyspnea: 3   Nausea/Vomiting: No  Anxiety:  Agitated at times   Depression: No  Fatigue: Yes   Loss of appetite: Yes    Pain: Not exhibiting pain behaviors            Character-            Duration-            Effect-            Factors-            Frequency-            Location-            Severity-    Review of Systems: Reviewed                    Unable to obtain due to poor mentation       MEDICATIONS  (STANDING):  ALBUTerol/ipratropium for Nebulization 3 milliLiter(s) Nebulizer every 6 hours  HYDROmorphone  Injectable 0.5 milliGRAM(s) IV Push every 6 hours  HYDROmorphone  Injectable 0.5 milliGRAM(s) IV Push once  piperacillin/tazobactam IVPB. 3.375 Gram(s) IV Intermittent every 12 hours  sodium chloride 0.9%. 1000 milliLiter(s) (30 mL/Hr) IV Continuous <Continuous>    MEDICATIONS  (PRN):  acetaminophen   Tablet 650 milliGRAM(s) Oral every 6 hours PRN For Temp greater than 38 C (100.4 F)  ALBUTerol    0.083% 2.5 milliGRAM(s) Nebulizer every 4 hours PRN Shortness of Breath and/or Wheezing  glycopyrrolate Injectable 0.2 milliGRAM(s) IV Push every 4 hours PRN secretion management  haloperidol    Injectable 0.5 milliGRAM(s) IV Push every 6 hours PRN agitation  LORazepam   Injectable 0.5 milliGRAM(s) IV Push every 4 hours PRN agitation restlessness moaning  metoprolol tartrate Injectable 2.5 milliGRAM(s) IV Push every 6 hours PRN HR > 120      PHYSICAL EXAM:    Vital Signs Last 24 Hrs  T(C): 36.8 (01 May 2018 08:10), Max: 36.9 (30 Apr 2018 22:32)  T(F): 98.2 (01 May 2018 08:10), Max: 98.4 (30 Apr 2018 22:32)  HR: 56 (01 May 2018 08:10) (56 - 87)  BP: 109/56 (01 May 2018 08:10) (101/59 - 109/60)  BP(mean): --  RR: 18 (01 May 2018 08:10) (18 - 20)  SpO2: 97% (01 May 2018 08:10) (86% - 97%)    General: alert  oriented x ____ lethargic agitated                  cachexia  nonverbal  coma    Karnofsky:  %    HEENT: normal  dry mouth  ET tube/trach    Lungs: comfortable tachypnea/labored breathing  excessive secretions    CV: normal  tachycardia    GI: normal  distended  tender  no BS               PEG/NG/OG tube  constipation  last BM:     : normal  incontinent  oliguria/anuria  thomas    MSK: normal  weakness  edema             ambulatory  bedbound/wheelchair bound    Skin: normal  pressure ulcers- Stage_____  no rash    LABS:                        9.9    8.4   )-----------( 195      ( 01 May 2018 07:51 )             32.4     05-01    141  |  99  |  63.0<H>  ----------------------------<  138<H>  4.3   |  26.0  |  2.24<H>    Ca    9.2      01 May 2018 07:51  Mg     2.5     05-01      PT/INR - ( 01 May 2018 07:51 )   PT: 12.5 sec;   INR: 1.13 ratio         PTT - ( 01 May 2018 07:51 )  PTT:27.7 sec    I&O's Summary    30 Apr 2018 07:01  -  01 May 2018 07:00  --------------------------------------------------------  IN: 800 mL / OUT: 0 mL / NET: 800 mL        RADIOLOGY & ADDITIONAL STUDIES:    ADVANCE DIRECTIVES:   DNR YES NO  Completed on:                     MOLST  YES NO   Completed on:  Living Will  YES NO   Completed on:      COUNSELING:    Face to face meeting to discuss Advanced Care Planning - Time Spent ______ Minutes.  See goals of care note.    More than 50% time spent in counseling and coordinating care. ______ Minutes.     Thank you for the opportunity to assist with the care of this patient.   [facility] Consult [facility] [phone]. HPI: This is a 89yo frail elderly female PMH of CHF and AFIB on anticoagulant TIA Breast Cancer; in RESPIRATORY FAILURE from Multifocal Pneumonia -Sepsis with  Hypoxia.   AMS worsening overnight. Labored breathing RR>26min on venti mask higher concentration O2 needed- less agitated today as per son.  Unresponsive to verbal stimuli-not opening her eyes, moaning and grunting at times. Becomes hypoxic when she has pulled off O2 mask O2Sat in 70 %  Unable to do ROS due to mental status. Son at bedside we were able to have had a conversation about Goals of Care is already DNR/DNI    89 years old female with PMH of CHF, A. Fib on Coumadin, MV Repair, TIA and Breast Cancer brought by EMS with shortness of breath. As per patient, it started 2 days ago and is associated with productive cough. She went to see her PMD yesterday, who gave her Cefuroxime and Benzonatate She took 3 doses of antibiotics but her symptoms were worsening instead of improving so she came to the hospital. She is feeling weak and nauseous but denies vomiting, abdominal pain, headache, dizziness, fever, sick contacts or recent travel.  She was Code Sepsis in ER. (26 Apr 2018 02:39)      PERTINENT PMH REVIEWED: Yes     PAST MEDICAL & SURGICAL HISTORY:  TIA (transient ischemic attack)  MVP (mitral valve prolapse)  Breast CA, right: lumpectomy  Pacemaker  Afib  H/O hernia repair  S/P mastectomy, right  S/P cataract surgery, right  S/P hysterectomy  S/P cholecystectomy: with AP  S/P mitral valve repair: PPM      SOCIAL HISTORY:  EtOH   No                                    Drugs    No                                      nonsmoker                                    Admitted from: home Son: Alvin Emmanuel [phone]    FAMILY HISTORY:  Family history of breast cancer in sister (Sibling)  Family history of stroke (Sibling, Sibling)    No Known Allergies    Baseline ADLs (prior to admission):  Independent/  Present Symptoms:     Dyspnea: 3   Nausea/Vomiting: No  Anxiety:  Agitated at times   Depression: No  Fatigue: Yes   Loss of appetite: Yes    Pain: Not exhibiting pain behaviors            Character-            Duration-            Effect-            Factors-            Frequency-            Location-            Severity-    Review of Systems: Reviewed                    Unable to obtain due to poor mentation       MEDICATIONS  (STANDING):  ALBUTerol/ipratropium for Nebulization 3 milliLiter(s) Nebulizer every 6 hours  HYDROmorphone  Injectable 0.5 milliGRAM(s) IV Push every 6 hours  HYDROmorphone  Injectable 0.5 milliGRAM(s) IV Push once  piperacillin/tazobactam IVPB. 3.375 Gram(s) IV Intermittent every 12 hours  sodium chloride 0.9%. 1000 milliLiter(s) (30 mL/Hr) IV Continuous <Continuous>    MEDICATIONS  (PRN):  acetaminophen   Tablet 650 milliGRAM(s) Oral every 6 hours PRN For Temp greater than 38 C (100.4 F)  ALBUTerol    0.083% 2.5 milliGRAM(s) Nebulizer every 4 hours PRN Shortness of Breath and/or Wheezing  glycopyrrolate Injectable 0.2 milliGRAM(s) IV Push every 4 hours PRN secretion management  haloperidol    Injectable 0.5 milliGRAM(s) IV Push every 6 hours PRN agitation  LORazepam   Injectable 0.5 milliGRAM(s) IV Push every 4 hours PRN agitation restlessness moaning  metoprolol tartrate Injectable 2.5 milliGRAM(s) IV Push every 6 hours PRN HR > 120      PHYSICAL EXAM:    Vital Signs Last 24 Hrs  T(C): 36.8 (01 May 2018 08:10), Max: 36.9 (30 Apr 2018 22:32)  T(F): 98.2 (01 May 2018 08:10), Max: 98.4 (30 Apr 2018 22:32)  HR: 56 (01 May 2018 08:10) (56 - 87)  BP: 109/56 (01 May 2018 08:10) (101/59 - 109/60)  BP(mean): --  RR: 18 (01 May 2018 08:10) (18 - 20)  SpO2: 97% (01 May 2018 08:10) (86% - 97%)    General: alert  oriented x ____ lethargic agitated                  cachexia  nonverbal  coma    Karnofsky:  %    HEENT: normal  dry mouth  ET tube/trach    Lungs: comfortable tachypnea/labored breathing  excessive secretions    CV: normal  tachycardia    GI: normal  distended  tender  no BS               PEG/NG/OG tube  constipation  last BM:     : normal  incontinent  oliguria/anuria  thomas    MSK: normal  weakness  edema             ambulatory  bedbound/wheelchair bound    Skin: normal  pressure ulcers- Stage_____  no rash    LABS:                        9.9    8.4   )-----------( 195      ( 01 May 2018 07:51 )             32.4     05-01    141  |  99  |  63.0<H>  ----------------------------<  138<H>  4.3   |  26.0  |  2.24<H>    Ca    9.2      01 May 2018 07:51  Mg     2.5     05-01      PT/INR - ( 01 May 2018 07:51 )   PT: 12.5 sec;   INR: 1.13 ratio         PTT - ( 01 May 2018 07:51 )  PTT:27.7 sec    I&O's Summary    30 Apr 2018 07:01  -  01 May 2018 07:00  --------------------------------------------------------  IN: 800 mL / OUT: 0 mL / NET: 800 mL        RADIOLOGY & ADDITIONAL STUDIES:    ADVANCE DIRECTIVES:   DNR YES NO  Completed on:                     MOLST  YES NO   Completed on:  Living Will  YES NO   Completed on:      COUNSELING:    Face to face meeting to discuss Advanced Care Planning - Time Spent ______ Minutes.  See goals of care note.    More than 50% time spent in counseling and coordinating care. ______ Minutes.     Thank you for the opportunity to assist with the care of this patient.   [facility] Consult Service [phone].

## 2018-05-01 NOTE — PROGRESS NOTE ADULT - SUBJECTIVE AND OBJECTIVE BOX
[facility] DIVISION OF KIDNEY DISEASES AND HYPERTENSION -- FOLLOW UP NOTE  --------------------------------------------------------------------------------  Chief Complaint: Pt. seen and evaluated for multilobar pneumonia  w. VERONIKA ( Pre - Renal )  Pt. was agitated  yesterday and removing her O2 with subsequent decline in SpO2.     Poorly Responsive,  On IVF & Antibiotics,     PAST HISTORY  --------------------------------------------------------------------------------  No significant changes to PMH, PSH, FHx, SHx, unless otherwise noted    ALLERGIES & MEDICATIONS  --------------------------------------------------------------------------------  Allergies    No Known Allergies    Standing Inpatient Medications  ALBUTerol/ipratropium for Nebulization 3 milliLiter(s) Nebulizer every 6 hours  cholecalciferol 1000 Unit(s) Oral daily  digoxin     Tablet 0.125 milliGRAM(s) Oral daily  multivitamin 1 Tablet(s) Oral daily  piperacillin/tazobactam IVPB. 3.375 Gram(s) IV Intermittent every 8 hours  risperiDONE   Tablet 0.5 milliGRAM(s) Oral two times a day  saccharomyces boulardii 250 milliGRAM(s) Oral two times a day  sodium chloride 0.9%. 1000 milliLiter(s) IV Continuous <Continuous>  vancomycin  IVPB 500 milliGRAM(s) IV Intermittent every 24 hours    PRN Inpatient Medications  acetaminophen   Tablet 650 milliGRAM(s) Oral every 6 hours PRN  acetaminophen   Tablet. 650 milliGRAM(s) Oral every 6 hours PRN  ALBUTerol    0.083% 2.5 milliGRAM(s) Nebulizer every 4 hours PRN  benzocaine 15 mG/menthol 3.6 mG Lozenge 1 Lozenge Oral every 3 hours PRN  guaiFENesin   Syrup  (Sugar-Free) 100 milliGRAM(s) Oral every 6 hours PRN  haloperidol    Injectable 0.5 milliGRAM(s) IV Push every 6 hours PRN  metoprolol tartrate Injectable 2.5 milliGRAM(s) IV Push every 6 hours PRN      REVIEW OF SYSTEMS : NA  --------------------------------------------------------------------------------  Gen: Frail , Elderly,  Skin: No rashes  Head/Eyes/Ears/Mouth: NA  Respiratory: Pn ( Multi Lobar )  CV: No  orthopnea  GI: No diarrhea,   : ? Incontinent,    All other systems were reviewed and are negative, except as noted.    VITALS/PHYSICAL EXAM  --------------------------------------------------------------------------------  T(C): 36.8 (05-01-18 @ 08:10), Max: 36.9 (04-30-18 @ 22:32)  HR: 56 (05-01-18 @ 08:10) (56 - 87)  BP: 109/56 (05-01-18 @ 08:10) (101/59 - 109/60)  RR: 18 (05-01-18 @ 08:10) (18 - 20)  SpO2: 97% (05-01-18 @ 08:10) (86% - 97%)  Wt(kg): --    04-30-18 @ 07:01  -  05-01-18 @ 07:00  --------------------------------------------------------  IN: 800 mL / OUT: 0 mL / NET: 800 mL      Physical Exam:  	Gen:  Frail, Elderly,  	HEENT: Pale, supple neck, Dry MM,  	Pulm: Rales, Rhonchi,  	CV: RSR, S1S2; no rub  	Abd: +BS, soft, nontender/nondistended  	: No suprapubic tenderness  	UE: Warm, FROM, no clubbing, intact strength; no edema; no asterixis  	LE: Warm, FROM, no clubbing, intact strength; no edema  	Neuro: No focal deficits,   	    LABS/STUDIES  --------------------------------------------------------------------------------               9.9    8.4   >-----------<  195      [05-01-18 @ 07:51]              32.4     141  |  99  |  63.0  ----------------------------<  138      [05-01-18 @ 07:51]  4.3   |  26.0  |  2.24        Ca     9.2     [05-01-18 @ 07:51]      Mg     2.5     [05-01-18 @ 07:51]      PT/INR: PT 12.5 , INR 1.13       [05-01-18 @ 07:51]  PTT: 27.7       [05-01-18 @ 07:51]      Creatinine Trend:  SCr 2.24 [05-01 @ 07:51]  SCr 2.01 [04-30 @ 13:18]  SCr 1.78 [04-29 @ 07:19]  SCr 1.68 [04-28 @ 07:28]  SCr 1.15 [04-26 @ 07:44]    Urinalysis - [04-26-18 @ 03:24]      Color Yellow / Appearance Clear / SG 1.015 / pH 5.0      Gluc Negative / Ketone Trace  / Bili Negative / Urobili Negative       Blood Small / Protein 30 / Leuk Est Negative / Nitrite Negative      RBC 0-2 / WBC Negative / Hyaline  / Gran  / Sq Epi  / Non Sq Epi Occasional / Bacteria       Lipid: chol 141, , HDL 41, LDL 67      [04-29-18 @ 07:19]    EXAM:  XR CHEST PORTABLE URGENT 1V                          PROCEDURE DATE:  04/30/2018      INTERPRETATION:  CHEST AP PORTABLE:    History: sob.     Date and time of exam: 4/30/2018 9:11 AM.    Technique: A single AP view of the chest was obtained.    Comparison exam: 4/28/2018 10:19 AM.    Findings:  Persistent diffuse airspace disease in the right lung without significant   change since prior study. Less pronounced but persistent airspace disease   in the left upper lobe, unchanged. Obscuration of the left hemidiaphragm,   likely a left pleural effusion. Cardiomegaly..    Impression:  Stable exam without significant change since the previous study..        VANDANA BILLS M.D., ATTENDING RADIOLOGIST  This document has been electronically signed. Apr 30 2018 10:25AM

## 2018-05-01 NOTE — PROGRESS NOTE ADULT - ASSESSMENT
Multi Lobar Pneumonia,    VERONIKA - ATN ( Ischemia )    P ; Supportive   care, No Role for HD,    Please call as Needed,      Thank you,

## 2018-05-01 NOTE — PROGRESS NOTE ADULT - ASSESSMENT
This is 89 years old woman with PMH of CHF, A. Fib on Coumadin, MV Repair, TIA and Breast Cancer brought by EMS with shortness of breath. She was Code Sepsis in ER, Xray chest showed multifocal upper lower lobe airspace consolidations, seen by ID     Assessment and Plan:  -Sepsis 2/2 multilobar pneumonia:  Continue IV Zosyn and Vancomycin.  NS@75cc/hr.  Duoneb tx Q6h.  ID f/u  -Chronic Systolic CHF:  no evidence of acute chf.  Off diuretics for now.  Cardiology f/u  -Afib:  Digoxin 0.125mg PO daily and Metoprolol 2.5mg IV Q6h PRN for HR>120.  Holding coumadin given supra-therapeutic INR  -Supra-therapeutic INR:  No gross bleeding.  Holding coumadin.  Monitor INR  -VERONIKA:  off diuretics.  started on NS hydration.  Nephrology consult.   -Anemia:  Hbg stable.  Will continue to monitor.    -VTE ppx:  Supra-therapeutic INR.  -Family considering comfort care if no improvement in next couple of days. This is 89 years old woman with PMH of CHF, A. Fib on Coumadin, MV Repair, TIA and Breast Cancer brought by EMS with shortness of breath. She was Code Sepsis in ER, Xray chest showed multifocal upper lower lobe airspace consolidations, seen by ID started on vanco and zosyn, also noted to have VERONIKA, no improvement, worsening respiratory status requiring ventimask, discuss goals of care with son present bedside, updated about patient condition, sign DNR/DNI,focus on comfort care, wanted to cont. Abx for now.    A/P    >Sepsis 2/2 multilobar pneumonia: appreciate ID -  Continue IV Zosyn and Vancomycin.    comfort care     >Acute hypoxic respiratory failure -as above     -Chronic Systolic CHF:   Cardiology consult appreciated   comfort care    >Afib: comfort car e    >VERONIKA:  off diuretics,   Nephrology consult appreciated   comfort care    >Anemia:  Hbg stable.  Will continue to monitor.      >Goals of care - discuss goals of care with son present, bedside, updated, answer all questions,   DNR/DNI/comfort care  Palliative and hospice care consult requested.  prognosis poor  no rapid response

## 2018-05-01 NOTE — DISCHARGE NOTE FOR THE EXPIRED PATIENT - HOSPITAL COURSE
This is 89 years old woman with PMH of CHF, A. Fib on Coumadin, MV Repair, TIA and Breast Cancer brought by EMS with shortness of breath. She was Code Sepsis in ER, Xray chest showed multifocal upper lower lobe airspace consolidations, seen by ID started on vanco and zosyn, also noted to have VERONIKA, no improvement, worsening respiratory status requiring ventimask, discuss goals of care with son present bedside, MOLST completed DNR/DNI, focus on comfort care, c/w abx per son wishes, pt was pronounced dead by Teetee Antonio at 14:40. Pt son Alvin Emmanuel was present bedside.     time spent 45 minutes This is 89 years old woman with PMH of CHF, A. Fib on Coumadin, MV Repair, TIA and Breast Cancer brought by EMS with shortness of breath. She was Code Sepsis in ER, Xray chest showed multifocal upper lower lobe airspace consolidations, seen by ID started on vanco and zosyn, also noted to have VERONIKA, no improvement, worsening respiratory status requiring ventimask, discuss goals of care with son present bedside, MOLST completed DNR/DNI, focus on comfort care, c/w abx per son wishes, pt was pronounced dead at 14:40. Pt son Alvin Emmanuel was present bedside.     time spent 45 minutes

## 2018-05-01 NOTE — PROGRESS NOTE ADULT - PROVIDER SPECIALTY LIST ADULT
Cardiology
Hospitalist
Infectious Disease
Nephrology
Cardiology
Hospitalist
Cardiology
Hospitalist

## 2018-05-01 NOTE — PROGRESS NOTE ADULT - SUBJECTIVE AND OBJECTIVE BOX
Internal Medicine Hospitalist - Dr. Maliha VIDAL    [id number]    89y      Female    Patient is a 89y old  Female who presents with a chief complaint of Shortness of breath (26 Apr 2018 02:39)          INTERVAL HPI/ OVERNIGHT EVENTS: Patient is seen and examined, no new event overnight.     REVIEW OF SYSTEMS:    Denied fever, chills, abd. pain, nausea, vomiting, chest pain, SOB, headache, dizziness    PHYSICAL EXAM:    Vital Signs Last 24 Hrs  T(C): 36.8 (01 May 2018 08:10), Max: 36.9 (30 Apr 2018 22:32)  T(F): 98.2 (01 May 2018 08:10), Max: 98.4 (30 Apr 2018 22:32)  HR: 56 (01 May 2018 08:10) (56 - 87)  BP: 109/56 (01 May 2018 08:10) (101/59 - 109/60)  BP(mean): --  RR: 18 (01 May 2018 08:10) (18 - 20)  SpO2: 97% (01 May 2018 08:10) (86% - 97%)    GENERAL: NAD  HEENT: EOMI  Neck: supple  CHEST/LUNG: CTA b/l   HEART: S1S2+ audible  ABDOMEN: Soft, Nontender, Nondistended; Bowel sounds present  EXTREMITIES:  no edema  CNS: AAO X 3  Psychiatry: normal mood    LABS:                        9.9    8.4   )-----------( 195      ( 01 May 2018 07:51 )             32.4     05-01    141  |  99  |  63.0<H>  ----------------------------<  138<H>  4.3   |  26.0  |  2.24<H>    Ca    9.2      01 May 2018 07:51  Mg     2.5     05-01      PT/INR - ( 01 May 2018 07:51 )   PT: 12.5 sec;   INR: 1.13 ratio         PTT - ( 01 May 2018 07:51 )  PTT:27.7 sec        MEDICATIONS  (STANDING):  ALBUTerol/ipratropium for Nebulization 3 milliLiter(s) Nebulizer every 6 hours  dextrose 5% + sodium chloride 0.45%. 1000 milliLiter(s) (60 mL/Hr) IV Continuous <Continuous>  HYDROmorphone  Injectable 0.5 milliGRAM(s) IV Push every 6 hours  piperacillin/tazobactam IVPB. 3.375 Gram(s) IV Intermittent every 12 hours    MEDICATIONS  (PRN):  acetaminophen   Tablet 650 milliGRAM(s) Oral every 6 hours PRN For Temp greater than 38 C (100.4 F)  ALBUTerol    0.083% 2.5 milliGRAM(s) Nebulizer every 4 hours PRN Shortness of Breath and/or Wheezing  glycopyrrolate Injectable 0.2 milliGRAM(s) IV Push every 4 hours PRN secretion management  haloperidol    Injectable 0.5 milliGRAM(s) IV Push every 6 hours PRN agitation  LORazepam   Injectable 0.5 milliGRAM(s) IV Push every 4 hours PRN agitation restlessness moaning  metoprolol tartrate Injectable 2.5 milliGRAM(s) IV Push every 6 hours PRN HR > 120      RADIOLOGY & ADDITIONAL TEST    < from: CT Abdomen and Pelvis w/Cont (08.01.14 @ 18:32) >  IMPRESSION: Diffusely dilated small and proximal large bowel without   definite transition point or obstructing mass identified. Mild mesenteric   edema. Findings may be related to a ileus or enteritis. Consider   colonoscopy on a nonemergent basis to assess the distal descending colon   as this is the site of pseudotransition to more collapsed bowel. No free   air.    < end of copied text >    < from: Xray Chest 1 View AP/PA. (04.26.18 @ 00:27) >  FINDINGS:   The lungs  show multifocal upper lower lobe airspace consolidations. No   pleural effusion. No pneumothorax.    < end of copied text > Internal Medicine Hospitalist - Dr. Maliha VIDAL    [id number]    89y      Female    Patient is a 89y old  Female who presents with a chief complaint of Shortness of breath (26 Apr 2018 02:39)    INTERVAL HPI/ OVERNIGHT EVENTS: Patient is seen and examined, pt cont. to require ventimask, lethargic.     REVIEW OF SYSTEMS:    unable to obtain     PHYSICAL EXAM:    Vital Signs Last 24 Hrs  T(C): 36.8 (01 May 2018 08:10), Max: 36.9 (30 Apr 2018 22:32)  T(F): 98.2 (01 May 2018 08:10), Max: 98.4 (30 Apr 2018 22:32)  HR: 56 (01 May 2018 08:10) (56 - 87)  BP: 109/56 (01 May 2018 08:10) (101/59 - 109/60)  BP(mean): --  RR: 18 (01 May 2018 08:10) (18 - 20)  SpO2: 97% (01 May 2018 08:10) (86% - 97%)    GENERAL: NAD  Neck: supple  CHEST/LUNG: positive air entry, few   HEART: S1S2+ audible  ABDOMEN: Soft, Nontender, Nondistended; Bowel sounds present  EXTREMITIES:  no edema  CNS: AAO X 3  Psychiatry: normal mood    LABS:                        9.9    8.4   )-----------( 195      ( 01 May 2018 07:51 )             32.4     05-01    141  |  99  |  63.0<H>  ----------------------------<  138<H>  4.3   |  26.0  |  2.24<H>    Ca    9.2      01 May 2018 07:51  Mg     2.5     05-01      PT/INR - ( 01 May 2018 07:51 )   PT: 12.5 sec;   INR: 1.13 ratio         PTT - ( 01 May 2018 07:51 )  PTT:27.7 sec        MEDICATIONS  (STANDING):  ALBUTerol/ipratropium for Nebulization 3 milliLiter(s) Nebulizer every 6 hours  dextrose 5% + sodium chloride 0.45%. 1000 milliLiter(s) (60 mL/Hr) IV Continuous <Continuous>  HYDROmorphone  Injectable 0.5 milliGRAM(s) IV Push every 6 hours  piperacillin/tazobactam IVPB. 3.375 Gram(s) IV Intermittent every 12 hours    MEDICATIONS  (PRN):  acetaminophen   Tablet 650 milliGRAM(s) Oral every 6 hours PRN For Temp greater than 38 C (100.4 F)  ALBUTerol    0.083% 2.5 milliGRAM(s) Nebulizer every 4 hours PRN Shortness of Breath and/or Wheezing  glycopyrrolate Injectable 0.2 milliGRAM(s) IV Push every 4 hours PRN secretion management  haloperidol    Injectable 0.5 milliGRAM(s) IV Push every 6 hours PRN agitation  LORazepam   Injectable 0.5 milliGRAM(s) IV Push every 4 hours PRN agitation restlessness moaning  metoprolol tartrate Injectable 2.5 milliGRAM(s) IV Push every 6 hours PRN HR > 120      RADIOLOGY & ADDITIONAL TEST    < from: CT Abdomen and Pelvis w/Cont (08.01.14 @ 18:32) >  IMPRESSION: Diffusely dilated small and proximal large bowel without   definite transition point or obstructing mass identified. Mild mesenteric   edema. Findings may be related to a ileus or enteritis. Consider   colonoscopy on a nonemergent basis to assess the distal descending colon   as this is the site of pseudotransition to more collapsed bowel. No free   air.    < end of copied text >    < from: Xray Chest 1 View AP/PA. (04.26.18 @ 00:27) >  FINDINGS:   The lungs  show multifocal upper lower lobe airspace consolidations. No   pleural effusion. No pneumothorax.    < end of copied text >

## 2018-05-01 NOTE — PROGRESS NOTE ADULT - SUBJECTIVE AND OBJECTIVE BOX
[facility] - [facility]                                   [address] [place] [address]                                                      PHONE: [phone]                                                         FAX: [phone]  [email or web address]  ---------------------------------------------------------------------------------------------------------------------------------    Overnight events/patient complaints: lethargic, VM in place     INTERPRETATION OF TELEMETRY (personally reviewed):    MEDICATIONS  (STANDING):  ALBUTerol/ipratropium for Nebulization 3 milliLiter(s) Nebulizer every 6 hours  cholecalciferol 1000 Unit(s) Oral daily  digoxin     Tablet 0.125 milliGRAM(s) Oral daily  multivitamin 1 Tablet(s) Oral daily  piperacillin/tazobactam IVPB. 3.375 Gram(s) IV Intermittent every 8 hours  risperiDONE   Tablet 0.5 milliGRAM(s) Oral two times a day  saccharomyces boulardii 250 milliGRAM(s) Oral two times a day  sodium chloride 0.9%. 1000 milliLiter(s) (75 mL/Hr) IV Continuous <Continuous>  vancomycin  IVPB 500 milliGRAM(s) IV Intermittent every 24 hours    MEDICATIONS  (PRN):  acetaminophen   Tablet 650 milliGRAM(s) Oral every 6 hours PRN For Temp greater than 38 C (100.4 F)  acetaminophen   Tablet. 650 milliGRAM(s) Oral every 6 hours PRN Headache or Bodyache  ALBUTerol    0.083% 2.5 milliGRAM(s) Nebulizer every 4 hours PRN Shortness of Breath and/or Wheezing  benzocaine 15 mG/menthol 3.6 mG Lozenge 1 Lozenge Oral every 3 hours PRN Sore Throat  guaiFENesin   Syrup  (Sugar-Free) 100 milliGRAM(s) Oral every 6 hours PRN Cough  haloperidol    Injectable 0.5 milliGRAM(s) IV Push every 6 hours PRN agitation  metoprolol tartrate Injectable 2.5 milliGRAM(s) IV Push every 6 hours PRN HR > 120      Vital Signs Last 24 Hrs  T(C): 36.8 (01 May 2018 08:10), Max: 36.9 (30 Apr 2018 22:32)  T(F): 98.2 (01 May 2018 08:10), Max: 98.4 (30 Apr 2018 22:32)  HR: 56 (01 May 2018 08:10) (56 - 87)  BP: 109/56 (01 May 2018 08:10) (101/59 - 109/60)  BP(mean): --  RR: 18 (01 May 2018 08:10) (18 - 20)  SpO2: 97% (01 May 2018 08:10) (86% - 97%)  ICU Vital Signs Last 24 Hrs    PHYSICAL EXAM:  General: elderly woman, lethargic, face mask in place   HEENT: Head; normocephalic, atraumatic. sclera anicteric, MMM, no JVD, neck is supple   CARDIOVASCULAR; 2/6 HARRIET at apex, no rub, gallop or lift. Normal S1 and S2.  LUNGS; Poor effort, scattered wheezing   ABDOMEN ; Soft, nontender without mass or organomegaly. bowel sounds normoactive.  EXTREMITIES; No clubbing, cyanosis, no LE edema. Distal pulses wnl. ROM normal.  SKIN; warm and dry with normal turgor.  NEURO; Alert/oriented x0  PSYCH; normal affect.    LABS:                        9.9    8.4   )-----------( 195      ( 01 May 2018 07:51 )             32.4     05-01    141  |  99  |  63.0<H>  ----------------------------<  138<H>  4.3   |  26.0  |  2.24<H>    Ca    9.2      01 May 2018 07:51  Mg     2.5     05-01      ROCCO VIDAL      PT/INR - ( 01 May 2018 07:51 )   PT: 12.5 sec;   INR: 1.13 ratio         PTT - ( 01 May 2018 07:51 )  PTT:27.7 sec      RADIOLOGY & ADDITIONAL STUDIES:    ASSESSMENT AND PLAN:  ROCCO VIDAL is a 89 year old woman with mitral valve prolapse status post repair 2001 with residual moderate to severe MR, normal coronary arteries, chronic atrial fibrillation on chronic anticoagulation with backup VVIR pacing, and breast cancer s/p lumpectomy who presents with progressive shortness of breath and productive cough with failure to improve with outpatient antibiotic therapy found to have sepsis secondary to human metapneumo viral infection and multifocal PNA as well as VERONIKA. Course is now complicated by delirium and persistent respiratory failure     Shortness of breath  - multifactorial including multifocal PNA and initially acute decompensated HFpEF (NYHA class III, AHA stage C) in the background of moderate to severe MR, currently hypovolemic   - continue antibiotics per primary team  - VERONIKA 2/2 hypovolemia; continue to hold diuresis   - continue gentle hydration; consider NGT and free water as well as enteral nutrition     Atrial fibrillation   [id number]- Vasc 7  - resume coumadin   - rate is controlled  - continue lopressor and digoxin   - telemetry monitoring     Thank you for allowing [facility] to participate in the care of this patient.  Please feel free to call with any questions or concerns.

## 2018-05-01 NOTE — PROGRESS NOTE ADULT - ASSESSMENT
Multilobar pneumonia in a 89y/o woman with positive metapneumovirus in RVR. Also with cardiac component and congested lungs. Now after diuresis has VERONIKA.

## 2018-05-02 LAB
CULTURE RESULTS: SIGNIFICANT CHANGE UP
SPECIMEN SOURCE: SIGNIFICANT CHANGE UP

## 2018-05-05 LAB
DIGITOXIN REPORTING LIMIT: 5 NG/ML — SIGNIFICANT CHANGE UP
DIGITOXIN SERPL-MCNC: SIGNIFICANT CHANGE UP NG/ML (ref 10–30)

## 2018-05-23 PROCEDURE — 80061 LIPID PANEL: CPT

## 2018-05-23 PROCEDURE — 80162 ASSAY OF DIGOXIN TOTAL: CPT

## 2018-05-23 PROCEDURE — 80048 BASIC METABOLIC PNL TOTAL CA: CPT

## 2018-05-23 PROCEDURE — 83605 ASSAY OF LACTIC ACID: CPT

## 2018-05-23 PROCEDURE — 80299 QUANTITATIVE ASSAY DRUG: CPT

## 2018-05-23 PROCEDURE — 85610 PROTHROMBIN TIME: CPT

## 2018-05-23 PROCEDURE — 80053 COMPREHEN METABOLIC PANEL: CPT

## 2018-05-23 PROCEDURE — 87040 BLOOD CULTURE FOR BACTERIA: CPT

## 2018-05-23 PROCEDURE — 85730 THROMBOPLASTIN TIME PARTIAL: CPT

## 2018-05-23 PROCEDURE — 87633 RESP VIRUS 12-25 TARGETS: CPT

## 2018-05-23 PROCEDURE — 81001 URINALYSIS AUTO W/SCOPE: CPT

## 2018-05-23 PROCEDURE — 82550 ASSAY OF CK (CPK): CPT

## 2018-05-23 PROCEDURE — 99285 EMERGENCY DEPT VISIT HI MDM: CPT | Mod: 25

## 2018-05-23 PROCEDURE — 80202 ASSAY OF VANCOMYCIN: CPT

## 2018-05-23 PROCEDURE — 36415 COLL VENOUS BLD VENIPUNCTURE: CPT

## 2018-05-23 PROCEDURE — 87581 M.PNEUMON DNA AMP PROBE: CPT

## 2018-05-23 PROCEDURE — 96375 TX/PRO/DX INJ NEW DRUG ADDON: CPT

## 2018-05-23 PROCEDURE — 84484 ASSAY OF TROPONIN QUANT: CPT

## 2018-05-23 PROCEDURE — 87798 DETECT AGENT NOS DNA AMP: CPT

## 2018-05-23 PROCEDURE — 93005 ELECTROCARDIOGRAM TRACING: CPT

## 2018-05-23 PROCEDURE — 82553 CREATINE MB FRACTION: CPT

## 2018-05-23 PROCEDURE — 96374 THER/PROPH/DIAG INJ IV PUSH: CPT

## 2018-05-23 PROCEDURE — 83880 ASSAY OF NATRIURETIC PEPTIDE: CPT

## 2018-05-23 PROCEDURE — 94640 AIRWAY INHALATION TREATMENT: CPT

## 2018-05-23 PROCEDURE — 84100 ASSAY OF PHOSPHORUS: CPT

## 2018-05-23 PROCEDURE — 83735 ASSAY OF MAGNESIUM: CPT

## 2018-05-23 PROCEDURE — 87449 NOS EACH ORGANISM AG IA: CPT

## 2018-05-23 PROCEDURE — 85027 COMPLETE CBC AUTOMATED: CPT

## 2018-05-23 PROCEDURE — 87486 CHLMYD PNEUM DNA AMP PROBE: CPT

## 2018-05-23 PROCEDURE — 71045 X-RAY EXAM CHEST 1 VIEW: CPT

## 2018-05-23 PROCEDURE — 87086 URINE CULTURE/COLONY COUNT: CPT

## 2018-07-24 PROBLEM — Z86.73 HISTORY OF STROKE: Status: RESOLVED | Noted: 2017-01-06 | Resolved: 2018-07-24
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
## 2025-01-08 NOTE — PROGRESS NOTE ADULT - ASSESSMENT
Patient's daughter picked up HA $0   89 years old female with PMH of CHF, A. Fib on Coumadin, MV Repair, TIA and Breast Cancer brought by EMS with shortness of breath. As per patient, it started 2 days ago and is associated with productive cough. She went to see her PMD yesterday, who gave her Cefuroxime and Benzonatate She took 3 doses of antibiotics but her symptoms were worsening instead of improving so she came to the hospital. She is feeling weak and nauseous but denies vomiting, abdominal pain, headache, dizziness, fever, sick contacts or recent travel.  She was Code Sepsis in ER.     1) Multilobar pneumonia. Post viral   ID following. Thinks post viral pneumonia.   - RVP Human metapneumovirus infection.   ID change Abx to Vancomycin and Zosyn for little response to CAP coverage with zithromycin and ceftriaxone.   - Florastor 250 mg    2) Sepsis  - Blood cx, Urine cx no growth.  - Continue Zosyn and vancomycin for multifocal pneumonia  Give tylenol IV for fever. Not tolerating PO meds.     3) Systolic Heart Failure  - Restart Lasix. Cardiology recommend 40mg iv daily     4) Atrial Fibrillation  - Rate controlled  - Hold Atenolol secondary to Sepsis. Will restart if BP remains stable.  - INR supratherapeutic. Holding coumadin and to restart when INR normalize    5) Supratherapeutic INR  - No signs of bleeding  - Repeat INR in am    6) VERONIKA  - Monitor renal function.  - Avoid nephrotoxic medications    7) Anemia  - Unknown baseline  - Denies GI bleed  - Monitor H&H    Disp: Prognosis is grim.  Discussed with one son who said he will wait a little to see if she gets better, otherwise will like to sign a mOLST form.